# Patient Record
Sex: FEMALE | Race: WHITE | Employment: FULL TIME | URBAN - METROPOLITAN AREA
[De-identification: names, ages, dates, MRNs, and addresses within clinical notes are randomized per-mention and may not be internally consistent; named-entity substitution may affect disease eponyms.]

---

## 2017-01-23 RX ORDER — DESOGESTREL AND ETHINYL ESTRADIOL 0.15-0.03
KIT ORAL
Qty: 3 PACKAGE | Refills: 12 | Status: SHIPPED | OUTPATIENT
Start: 2017-01-23 | End: 2018-02-16

## 2017-01-23 RX ORDER — SUMATRIPTAN 100 MG/1
100 TABLET, FILM COATED ORAL
COMMUNITY
Start: 2015-08-12 | End: 2017-06-13

## 2017-01-23 RX ORDER — DESOGESTREL AND ETHINYL ESTRADIOL 0.15-0.03
KIT ORAL
Refills: 0 | COMMUNITY
Start: 2016-12-24 | End: 2017-01-23

## 2017-01-23 RX ORDER — BUTALBITAL, ASPIRIN, AND CAFFEINE 50; 325; 40 MG/1; MG/1; MG/1
2 CAPSULE ORAL
COMMUNITY
Start: 2014-10-01 | End: 2017-04-19

## 2017-01-23 RX ORDER — TOPIRAMATE 50 MG/1
TABLET, FILM COATED ORAL
Refills: 5 | COMMUNITY
Start: 2017-01-19 | End: 2017-04-19

## 2017-01-23 RX ORDER — ALBUTEROL SULFATE 2.5 MG/3ML
2.5 SOLUTION RESPIRATORY (INHALATION)
COMMUNITY
Start: 2007-01-04 | End: 2017-02-15

## 2017-01-23 NOTE — TELEPHONE ENCOUNTER
Pt is asking for refill of her birth control- had her px in November. Pt  Is out. Lauryn Mccord, 01/23/2017, 4:48 PM

## 2017-02-15 ENCOUNTER — OFFICE VISIT (OUTPATIENT)
Dept: FAMILY MEDICINE CLINIC | Facility: CLINIC | Age: 27
End: 2017-02-15

## 2017-02-15 VITALS
WEIGHT: 142.19 LBS | BODY MASS INDEX: 23.98 KG/M2 | DIASTOLIC BLOOD PRESSURE: 80 MMHG | SYSTOLIC BLOOD PRESSURE: 108 MMHG | HEIGHT: 64.5 IN | HEART RATE: 80 BPM | TEMPERATURE: 98 F | RESPIRATION RATE: 20 BRPM | OXYGEN SATURATION: 99 %

## 2017-02-15 DIAGNOSIS — J45.20 MILD INTERMITTENT ASTHMA WITHOUT COMPLICATION: ICD-10-CM

## 2017-02-15 DIAGNOSIS — J20.9 BRONCHITIS WITH BRONCHOSPASM: Primary | ICD-10-CM

## 2017-02-15 PROBLEM — Z90.89 S/P TONSILLECTOMY AND ADENOIDECTOMY: Status: ACTIVE | Noted: 2017-02-15

## 2017-02-15 PROBLEM — J45.909 ASTHMA: Status: ACTIVE | Noted: 2017-02-15

## 2017-02-15 PROCEDURE — 99214 OFFICE O/P EST MOD 30 MIN: CPT | Performed by: FAMILY MEDICINE

## 2017-02-15 RX ORDER — ALBUTEROL SULFATE 2.5 MG/3ML
2.5 SOLUTION RESPIRATORY (INHALATION) EVERY 4 HOURS PRN
Qty: 1 BOX | Refills: 1 | Status: SHIPPED | OUTPATIENT
Start: 2017-02-15 | End: 2018-08-30

## 2017-02-15 RX ORDER — CEFDINIR 300 MG/1
300 CAPSULE ORAL 2 TIMES DAILY
Qty: 20 CAPSULE | Refills: 0 | Status: SHIPPED | OUTPATIENT
Start: 2017-02-15 | End: 2017-04-19

## 2017-02-16 NOTE — PROGRESS NOTES
CHIEF COMPLAINT:   Patient presents with:  Cough: 4 days  Chest Congestion: 2 days- also nasal congestion      HPI:   Arelis Guillen is a 27year old female who presents to clinic today with complaints of uri-cough and congestion  Pt noting pain from cou numbness or tingling in face.  See HPI    EXAM:   /80 mmHg  Pulse 80  Temp(Src) 98.4 °F (36.9 °C) (Tympanic)  Resp 20  Ht 64.5\"  Wt 142 lb 3 oz  BMI 24.04 kg/m2  SpO2 99%  LMP 01/25/2017  GENERAL: well developed, well nourished,in no apparent distres MD

## 2017-04-19 ENCOUNTER — OFFICE VISIT (OUTPATIENT)
Dept: FAMILY MEDICINE CLINIC | Facility: CLINIC | Age: 27
End: 2017-04-19

## 2017-04-19 VITALS
DIASTOLIC BLOOD PRESSURE: 60 MMHG | BODY MASS INDEX: 25 KG/M2 | RESPIRATION RATE: 16 BRPM | SYSTOLIC BLOOD PRESSURE: 112 MMHG | WEIGHT: 145.5 LBS | HEART RATE: 62 BPM | TEMPERATURE: 99 F

## 2017-04-19 DIAGNOSIS — B97.7 HUMAN PAPILLOMA VIRUS: ICD-10-CM

## 2017-04-19 DIAGNOSIS — R87.612 PAP SMEAR ABNORMALITY OF CERVIX WITH LGSIL: ICD-10-CM

## 2017-04-19 DIAGNOSIS — R87.610 ATYPICAL SQUAMOUS CELLS OF UNDETERMINED SIGNIFICANCE (ASCUS) ON PAPANICOLAOU SMEAR OF CERVIX: Primary | ICD-10-CM

## 2017-04-19 DIAGNOSIS — N87.0 MILD DYSPLASIA OF CERVIX (CIN I): ICD-10-CM

## 2017-04-19 PROCEDURE — 99214 OFFICE O/P EST MOD 30 MIN: CPT | Performed by: FAMILY MEDICINE

## 2017-04-19 PROCEDURE — 87491 CHLMYD TRACH DNA AMP PROBE: CPT | Performed by: FAMILY MEDICINE

## 2017-04-19 PROCEDURE — 87624 HPV HI-RISK TYP POOLED RSLT: CPT | Performed by: FAMILY MEDICINE

## 2017-04-19 PROCEDURE — 87591 N.GONORRHOEAE DNA AMP PROB: CPT | Performed by: FAMILY MEDICINE

## 2017-04-19 PROCEDURE — 88175 CYTOPATH C/V AUTO FLUID REDO: CPT | Performed by: FAMILY MEDICINE

## 2017-04-19 NOTE — PROGRESS NOTES
CC: Annual Physical Exam    HPI:   Meño Chow is a 27year old female who presents for a complete physical exam. Symptoms: denies discharge, itching, burning or dysuria, periods are regular. Patient complains of occasional mild discharge.   No abdominal No            Other Topics            Concern  Caffeine Concern        No  Exercise                No  Seat Belt               Yes  Special Diet            No  Stress Concern          No  Weight Concern          No            GYNE HX            REVIEW OF S tenderness  : Introitus normal, no lesion, vagina moist, no significant discharge, cervix non-tender, no adnexal masses or tenderness, no masses on bimanual exam, bladder not distended.   EXTREMITIES:  No edema, no cyanosis, no clubbing,  NEURO:  No defic

## 2017-04-21 ENCOUNTER — TELEPHONE (OUTPATIENT)
Dept: FAMILY MEDICINE CLINIC | Facility: CLINIC | Age: 27
End: 2017-04-21

## 2017-04-25 ENCOUNTER — TELEPHONE (OUTPATIENT)
Dept: FAMILY MEDICINE CLINIC | Facility: CLINIC | Age: 27
End: 2017-04-25

## 2017-04-25 NOTE — TELEPHONE ENCOUNTER
----- Message from Paris Armas MD sent at 4/24/2017  8:22 PM CDT -----  Negative HPV  Negative pap  Recheck 1 yr

## 2017-05-26 ENCOUNTER — OFFICE VISIT (OUTPATIENT)
Dept: FAMILY MEDICINE CLINIC | Facility: CLINIC | Age: 27
End: 2017-05-26

## 2017-05-26 VITALS
TEMPERATURE: 99 F | HEART RATE: 82 BPM | BODY MASS INDEX: 25 KG/M2 | DIASTOLIC BLOOD PRESSURE: 72 MMHG | SYSTOLIC BLOOD PRESSURE: 102 MMHG | WEIGHT: 149.81 LBS | OXYGEN SATURATION: 98 %

## 2017-05-26 DIAGNOSIS — J02.9 SORE THROAT: ICD-10-CM

## 2017-05-26 DIAGNOSIS — J01.00 ACUTE NON-RECURRENT MAXILLARY SINUSITIS: Primary | ICD-10-CM

## 2017-05-26 DIAGNOSIS — Z87.891 FORMER SMOKER: ICD-10-CM

## 2017-05-26 PROBLEM — G43.909 MIGRAINE: Status: ACTIVE | Noted: 2017-05-26

## 2017-05-26 PROCEDURE — 87081 CULTURE SCREEN ONLY: CPT | Performed by: NURSE PRACTITIONER

## 2017-05-26 PROCEDURE — 87880 STREP A ASSAY W/OPTIC: CPT | Performed by: NURSE PRACTITIONER

## 2017-05-26 PROCEDURE — 99214 OFFICE O/P EST MOD 30 MIN: CPT | Performed by: NURSE PRACTITIONER

## 2017-05-26 RX ORDER — AMOXICILLIN 500 MG/1
500 CAPSULE ORAL 3 TIMES DAILY
Qty: 30 CAPSULE | Refills: 0 | Status: SHIPPED | OUTPATIENT
Start: 2017-05-26 | End: 2017-06-05

## 2017-05-26 NOTE — PROGRESS NOTES
Chief complaint:  Patient presents with:  Cough  Nasal Congestion  Sore Throat      HPI:   Evelio Perez is a 32year old female who presents for upper respiratory symptoms for  2  weeks.   Patient states that she started having nasal congestion and postna RECLIPSEN 0.15-30 MG-MCG Oral Tab TK 1 T PO QD Disp: 3 Package Rfl: 12      Past Medical History   Diagnosis Date   • Asthma           Past Surgical History    CHOLECYSTECTOMY      WISDOM TEETH REMOVED      TONSILLECTOMY        Family History   Problem R with a clear background (yes/no) Yes Yes/No   Kit Lot # [id number] Numeric   Kit Expiration Date 2018-10-31 Date     Strep throat culture pending      ASSESSMENT AND PLAN:   Jac Arce is a 32year old female who presents with Cough;  Nasal Congestion;

## 2017-05-26 NOTE — PATIENT INSTRUCTIONS
Push fluids, rest.  Antibiotic as prescribed, take with food. Flonase nasal spray 1 spray each nostril twice a day. Stop afrin nasal spray. Tylenol or ibuprofen over the counter for discomfort. Mucinex otc for symptom relief.   1tsp honey three times a

## 2017-05-30 ENCOUNTER — TELEPHONE (OUTPATIENT)
Dept: FAMILY MEDICINE CLINIC | Facility: CLINIC | Age: 27
End: 2017-05-30

## 2017-05-30 NOTE — TELEPHONE ENCOUNTER
----- Message from SIMONE Chun sent at 5/30/2017  9:27 AM CDT -----  Please notify pt that strep culture is negative.

## 2017-06-13 ENCOUNTER — TELEPHONE (OUTPATIENT)
Dept: FAMILY MEDICINE CLINIC | Facility: CLINIC | Age: 27
End: 2017-06-13

## 2017-06-13 RX ORDER — SUMATRIPTAN 100 MG/1
TABLET, FILM COATED ORAL
Qty: 30 TABLET | Refills: 0 | Status: SHIPPED | OUTPATIENT
Start: 2017-06-13 | End: 2018-05-21

## 2017-06-13 NOTE — TELEPHONE ENCOUNTER
LOV: 5/26/17     Pt has stopped seeing her neurologist and said she should call you for refills. Took last pill this morning- has been having headaches non-stop the past 3 days.

## 2017-09-05 ENCOUNTER — TELEPHONE (OUTPATIENT)
Dept: FAMILY MEDICINE CLINIC | Facility: CLINIC | Age: 27
End: 2017-09-05

## 2017-09-05 NOTE — TELEPHONE ENCOUNTER
I called the pharmacy regarding the refill of the patient's birth control not in stock. Brenda Coyne from the [facility] that they received their order for her birth control. Patient informed.

## 2017-09-05 NOTE — TELEPHONE ENCOUNTER
This is a generic ocp. Pt should be able to have pharmacist changer her to another with out changing dose or prescription.

## 2017-09-05 NOTE — TELEPHONE ENCOUNTER
[facility] doesn't carry medication Reclipson (sp?) Wondering if there is another medication she can take

## 2017-09-05 NOTE — TELEPHONE ENCOUNTER
Patient states that she tried to get a refill of her reclipsen and all the pharmacies in the [facility] no longer carry this medication. She states she called around to several [facility] and [facility].   She thinks she will have to be changed to something

## 2017-10-27 ENCOUNTER — TELEPHONE (OUTPATIENT)
Dept: FAMILY MEDICINE CLINIC | Facility: CLINIC | Age: 27
End: 2017-10-27

## 2017-10-27 NOTE — TELEPHONE ENCOUNTER
Pt called stating she has had nasal congestion x 1 month. On Wednesday she started with a raspy voice but denies any throat pain. She states that today she has been having what she describes as an \"aching in the center of her chest\".  She denies any cough

## 2017-10-27 NOTE — TELEPHONE ENCOUNTER
Future Appointments  Date Time Provider Amberly Bárbara   10/28/2017 9:30 AM Alem Coleman, [facility] [place]     Pt informed.

## 2017-10-28 ENCOUNTER — OFFICE VISIT (OUTPATIENT)
Dept: FAMILY MEDICINE CLINIC | Facility: CLINIC | Age: 27
End: 2017-10-28

## 2017-10-28 VITALS
BODY MASS INDEX: 24.37 KG/M2 | SYSTOLIC BLOOD PRESSURE: 110 MMHG | DIASTOLIC BLOOD PRESSURE: 70 MMHG | WEIGHT: 151.63 LBS | HEART RATE: 83 BPM | RESPIRATION RATE: 16 BRPM | OXYGEN SATURATION: 97 % | TEMPERATURE: 99 F | HEIGHT: 66 IN

## 2017-10-28 DIAGNOSIS — J01.90 ACUTE SINUSITIS, RECURRENCE NOT SPECIFIED, UNSPECIFIED LOCATION: Primary | ICD-10-CM

## 2017-10-28 PROCEDURE — 99213 OFFICE O/P EST LOW 20 MIN: CPT | Performed by: NURSE PRACTITIONER

## 2017-10-28 RX ORDER — PREDNISONE 20 MG/1
20 TABLET ORAL DAILY
Qty: 5 TABLET | Refills: 0 | Status: SHIPPED | OUTPATIENT
Start: 2017-10-28 | End: 2017-11-02

## 2017-10-28 NOTE — PROGRESS NOTES
HPI:    Patient ID: David Nguyen is a 32year old female. HPI   Patient is present with concern about sinuses. States she has been fighting congestion for the past month. Thought it was related to the weather.   When staying her symptoms got signific well-nourished. No distress. HENT:   Head: Normocephalic and atraumatic. Right Ear: Hearing, tympanic membrane, external ear and ear canal normal.   Left Ear: Hearing, tympanic membrane, external ear and ear canal normal.   Nose: Mucosal edema present.

## 2017-10-28 NOTE — PATIENT INSTRUCTIONS
Take the prednisone with food and earlier in the day. When completing the prednisone resume the Flonase. Will contact me next week if symptoms not improving or if congestion becomes more infectious. Otherwise follow-up as needed.

## 2018-01-09 ENCOUNTER — TELEPHONE (OUTPATIENT)
Dept: FAMILY MEDICINE CLINIC | Facility: CLINIC | Age: 28
End: 2018-01-09

## 2018-01-09 RX ORDER — OSELTAMIVIR PHOSPHATE 75 MG/1
75 CAPSULE ORAL 2 TIMES DAILY
Qty: 10 CAPSULE | Refills: 0 | Status: SHIPPED | OUTPATIENT
Start: 2018-01-09 | End: 2018-01-14

## 2018-01-09 NOTE — TELEPHONE ENCOUNTER
mom seen elesewhere diag. 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  Please advise

## 2018-01-09 NOTE — TELEPHONE ENCOUNTER
Pt states her mother was dx: with influenza A on Saturday at urgent care. Pt lives with her mother. Pt states she is coming down with flu-like s/s today. Pt states she was sent home from work today. Pt c/o low grade temp 100.3, body aches, sore throat.

## 2018-01-09 NOTE — TELEPHONE ENCOUNTER
Pt well known to me. Options of symptomatic care, appt or tamiflu Rx can be offered. If pt takes Rx for tamiflu and is not better or has worsening symptoms-  She should be seen with appointment.

## 2018-02-16 NOTE — TELEPHONE ENCOUNTER
Future appt:    Last Appointment: 10/28/2017    No results found for: CHOLEST, HDL, LDL, TRIGLY, TRIG  No results found for: EAG, A1C  No results found for: T4F, TSH, TSHT4    No followup indicated

## 2018-02-19 ENCOUNTER — TELEPHONE (OUTPATIENT)
Dept: FAMILY MEDICINE CLINIC | Facility: CLINIC | Age: 28
End: 2018-02-19

## 2018-02-19 NOTE — TELEPHONE ENCOUNTER
Patient states she got her eyebrow's tinted for the first time on Thursday. Now has \"hives\" or lumps under skin that is extremely dry it is painful. States swelling is almost to her eyelids. If she moves her face or makes an expression it hurts.  States p

## 2018-02-19 NOTE — TELEPHONE ENCOUNTER
It sounds like she might have a secondary bacterial infection. Would recommend applying bacitracin ointment and following up for an appointment for evaluation to see if she needs oral antibiotics.

## 2018-02-20 ENCOUNTER — OFFICE VISIT (OUTPATIENT)
Dept: FAMILY MEDICINE CLINIC | Facility: CLINIC | Age: 28
End: 2018-02-20

## 2018-02-20 VITALS
SYSTOLIC BLOOD PRESSURE: 118 MMHG | OXYGEN SATURATION: 98 % | HEART RATE: 71 BPM | WEIGHT: 156 LBS | BODY MASS INDEX: 25 KG/M2 | DIASTOLIC BLOOD PRESSURE: 80 MMHG | TEMPERATURE: 98 F

## 2018-02-20 DIAGNOSIS — L01.00 IMPETIGO: Primary | ICD-10-CM

## 2018-02-20 PROCEDURE — 99213 OFFICE O/P EST LOW 20 MIN: CPT | Performed by: NURSE PRACTITIONER

## 2018-02-20 NOTE — PROGRESS NOTES
Evelio Perez is a 32year old female.   Patient presents with:  Swelling: eyebrows      HPI:   Complaints of tinted and waxed on Thursday (February 15)– states she had some hives at the times -states in the past she occasionally will have hives briefly, b RESPIRATORY: denies complaints   CARDIOVASCULAR: denies complaints   MUSCULOSKELETAL:  No arthralgias or myalgias    EXAM:   /80 (BP Location: Right arm, Patient Position: Sitting, Cuff Size: adult)   Pulse 71   Temp 98 °F (36.7 °C) (Tympanic)   Wt 1 Many types of bacteria live on normal, healthy skin. The bacteria usually don’t cause problems. Impetigo happens when bacteria enter the skin through a scratch, break, sore, bite, or irritated spot.  They then begin to grow out of control, leading to infect · Clean the affected skin several times a day. Don’t scrub. Instead, soak the area in warm, soapy water. This will help remove the crust that forms.  For places that you can't soak, such as the face, place a clean, warm (not hot) washcloth on the affected a

## 2018-02-20 NOTE — PATIENT INSTRUCTIONS
Baby shampoo- wash eyebrows with warm water and shampoo. Apply Bacitracin ointment - twice a day for 7 days -  Call if increased redness, would give or oral antibiotic. Understanding Impetigo  Impetigo is a common bacterial infection of the skin.  It your sores. It may help to cover affected areas with a bandage. · To prevent spreading the infection, wash your hands often. Avoid sharing personal items, towels, clothes, pillows, and sheets with others. After each use, wash these items in hot water.   ·

## 2018-02-21 ENCOUNTER — TELEPHONE (OUTPATIENT)
Dept: FAMILY MEDICINE CLINIC | Facility: CLINIC | Age: 28
End: 2018-02-21

## 2018-02-21 RX ORDER — CEPHALEXIN 500 MG/1
500 CAPSULE ORAL 3 TIMES DAILY
Qty: 21 CAPSULE | Refills: 0 | Status: SHIPPED | OUTPATIENT
Start: 2018-02-21 | End: 2018-02-28

## 2018-02-21 NOTE — TELEPHONE ENCOUNTER
Patient states it has gotten really itchy and is burning. Gotten worse since yesterday. States she thinks she needs the antibotic.   Patient is worried she might get a yeast infection from the antibotic, can you give her something for a yeast infection just

## 2018-02-23 ENCOUNTER — TELEPHONE (OUTPATIENT)
Dept: FAMILY MEDICINE CLINIC | Facility: CLINIC | Age: 28
End: 2018-02-23

## 2018-02-23 NOTE — TELEPHONE ENCOUNTER
Pt was seen per EL on Tues 2/20-   Pt states that she was dx: with impetigo  Pt called back next day- was given treatment  Currently treated with Keflex,  Pt states eyebrows are worse, looks like blisters. Pt leaving for [place] tomorrow. Discussed with OBED.

## 2018-02-23 NOTE — TELEPHONE ENCOUNTER
saw Terrance Zavala on Tuesday and put on an antibiotic for impetigo in her eyebrows/eyes, feels she is getting worse and going out of town tomorrow

## 2018-02-24 ENCOUNTER — OFFICE VISIT (OUTPATIENT)
Dept: FAMILY MEDICINE CLINIC | Facility: CLINIC | Age: 28
End: 2018-02-24

## 2018-02-24 VITALS
TEMPERATURE: 98 F | DIASTOLIC BLOOD PRESSURE: 78 MMHG | RESPIRATION RATE: 16 BRPM | BODY MASS INDEX: 25.89 KG/M2 | WEIGHT: 151.63 LBS | HEIGHT: 64.25 IN | SYSTOLIC BLOOD PRESSURE: 102 MMHG | HEART RATE: 68 BPM

## 2018-02-24 DIAGNOSIS — L24.3 IRRITANT CONTACT DERMATITIS DUE TO COSMETICS: Primary | ICD-10-CM

## 2018-02-24 PROCEDURE — 99214 OFFICE O/P EST MOD 30 MIN: CPT | Performed by: FAMILY MEDICINE

## 2018-02-24 RX ORDER — FLUCONAZOLE 150 MG/1
150 TABLET ORAL ONCE
Qty: 1 TABLET | Refills: 0 | Status: SHIPPED | OUTPATIENT
Start: 2018-02-24 | End: 2018-02-24

## 2018-02-24 RX ORDER — PREDNISONE 20 MG/1
20 TABLET ORAL 2 TIMES DAILY
Qty: 10 TABLET | Refills: 0 | Status: SHIPPED | OUTPATIENT
Start: 2018-02-24 | End: 2018-03-01

## 2018-02-24 NOTE — PROGRESS NOTES
Rebel Rollins is a 32year old female. HPI:   Patient presents for recheck of her irritation of her eyebrows. . Pt has been taking medications as instructed, no medication side effects.  *Patient related the history of having her eyebrows waxed and tint Past Surgical History:  No date: CHOLECYSTECTOMY  No date: TONSILLECTOMY  No date: WISDOM TEETH REMOVED   Social History:  Smoking: Denies       Results for orders placed or performed in visit on 05/26/17  -STREP A ASSAY W/OPTIC   Result Value Ref Range days.  TF#[id number]

## 2018-02-24 NOTE — PATIENT INSTRUCTIONS
REC FINSIH ANTIBIOTIC    CONTINUE BABY SHAMPOO WASHES AND ANTIBIOTIC OINTMENT 2 X A DAY    ADD STEROID FOR 5 DAYS     RECHECK IN A WEEK OR SO    CALL IF CONCERNS

## 2018-03-21 ENCOUNTER — OFFICE VISIT (OUTPATIENT)
Dept: FAMILY MEDICINE CLINIC | Facility: CLINIC | Age: 28
End: 2018-03-21

## 2018-03-21 VITALS
HEIGHT: 64.25 IN | OXYGEN SATURATION: 99 % | HEART RATE: 74 BPM | WEIGHT: 153.81 LBS | TEMPERATURE: 98 F | RESPIRATION RATE: 14 BRPM | DIASTOLIC BLOOD PRESSURE: 60 MMHG | BODY MASS INDEX: 26.26 KG/M2 | SYSTOLIC BLOOD PRESSURE: 104 MMHG

## 2018-03-21 DIAGNOSIS — L24.3 IRRITANT CONTACT DERMATITIS DUE TO COSMETICS: Primary | ICD-10-CM

## 2018-03-21 PROCEDURE — 99212 OFFICE O/P EST SF 10 MIN: CPT | Performed by: FAMILY MEDICINE

## 2018-03-21 NOTE — PROGRESS NOTES
Agnes Osorio is a 32year old female. HPI:   Patient presents for recheck of her dermatitis. Pt has took prednisone/medications as instructed, no medication side effects. Pt notes eyebrow rash much improved after first day of steroids.  Pt happy with (Temporal)   Resp 14   Ht 64.25\"   Wt 153 lb 12.8 oz   SpO2 99%   BMI 26.19 kg/m²   GENERAL: well developed, well nourished,in no apparent distress  SKIN: no rashes,no suspicious lesions, small papule left upper eyebrow, no redness  NECK: supple,no adenop

## 2018-05-21 ENCOUNTER — APPOINTMENT (OUTPATIENT)
Dept: LAB | Age: 28
End: 2018-05-21
Attending: FAMILY MEDICINE
Payer: COMMERCIAL

## 2018-05-21 ENCOUNTER — OFFICE VISIT (OUTPATIENT)
Dept: FAMILY MEDICINE CLINIC | Facility: CLINIC | Age: 28
End: 2018-05-21

## 2018-05-21 VITALS
BODY MASS INDEX: 26.06 KG/M2 | SYSTOLIC BLOOD PRESSURE: 106 MMHG | HEART RATE: 82 BPM | DIASTOLIC BLOOD PRESSURE: 82 MMHG | RESPIRATION RATE: 14 BRPM | HEIGHT: 64.25 IN | TEMPERATURE: 98 F | OXYGEN SATURATION: 98 % | WEIGHT: 152.63 LBS

## 2018-05-21 DIAGNOSIS — Z00.00 ANNUAL PHYSICAL EXAM: Primary | ICD-10-CM

## 2018-05-21 DIAGNOSIS — G43.709 CHRONIC MIGRAINE WITHOUT AURA WITHOUT STATUS MIGRAINOSUS, NOT INTRACTABLE: ICD-10-CM

## 2018-05-21 DIAGNOSIS — J45.20 MILD INTERMITTENT ASTHMA WITHOUT COMPLICATION: ICD-10-CM

## 2018-05-21 DIAGNOSIS — E53.8 ADENOSYLCOBALAMIN SYNTHESIS DEFECT: ICD-10-CM

## 2018-05-21 DIAGNOSIS — E53.8 B12 DEFICIENCY: ICD-10-CM

## 2018-05-21 DIAGNOSIS — N87.0 MILD DYSPLASIA OF CERVIX (CIN I): ICD-10-CM

## 2018-05-21 DIAGNOSIS — B97.7 HUMAN PAPILLOMA VIRUS: ICD-10-CM

## 2018-05-21 DIAGNOSIS — R87.612 PAP SMEAR ABNORMALITY OF CERVIX WITH LGSIL: ICD-10-CM

## 2018-05-21 PROBLEM — Z90.89 S/P TONSILLECTOMY AND ADENOIDECTOMY: Status: RESOLVED | Noted: 2017-02-15 | Resolved: 2018-05-21

## 2018-05-21 PROCEDURE — 36415 COLL VENOUS BLD VENIPUNCTURE: CPT | Performed by: FAMILY MEDICINE

## 2018-05-21 PROCEDURE — 80061 LIPID PANEL: CPT | Performed by: FAMILY MEDICINE

## 2018-05-21 PROCEDURE — 82607 VITAMIN B-12: CPT | Performed by: FAMILY MEDICINE

## 2018-05-21 PROCEDURE — 87624 HPV HI-RISK TYP POOLED RSLT: CPT | Performed by: FAMILY MEDICINE

## 2018-05-21 PROCEDURE — 81001 URINALYSIS AUTO W/SCOPE: CPT | Performed by: FAMILY MEDICINE

## 2018-05-21 PROCEDURE — 88175 CYTOPATH C/V AUTO FLUID REDO: CPT | Performed by: FAMILY MEDICINE

## 2018-05-21 PROCEDURE — 99395 PREV VISIT EST AGE 18-39: CPT | Performed by: FAMILY MEDICINE

## 2018-05-21 PROCEDURE — 80050 GENERAL HEALTH PANEL: CPT | Performed by: FAMILY MEDICINE

## 2018-05-21 RX ORDER — DESOGESTREL AND ETHINYL ESTRADIOL 0.15-0.03
1 KIT ORAL
Qty: 84 TABLET | Refills: 3 | Status: SHIPPED | OUTPATIENT
Start: 2018-05-21 | End: 2019-07-06

## 2018-05-21 RX ORDER — SUMATRIPTAN 100 MG/1
TABLET, FILM COATED ORAL
Qty: 30 TABLET | Refills: 0 | Status: SHIPPED | OUTPATIENT
Start: 2018-05-21 | End: 2019-05-29

## 2018-05-21 NOTE — PATIENT INSTRUCTIONS
Colace -- 1 or 2 a day to soften stool, increase fiber in diet, encourage exercise 30 min 3-5 x a week    Pap done today    Encourage stretching and range of motion exercises of arms and shoulders    Recheck yearly and prn    Fasting labs today

## 2018-05-21 NOTE — PROGRESS NOTES
CC: Annual Physical Exam    HPI:   Zara Cevallos is a 32year old female who presents for a complete physical exam.   On ocp   no smoking 2 years    Hand cramping at times.  No injury no swelling  Pt with some soreness of her left shoulder and the cramping History:  No date: CHOLECYSTECTOMY  No date: TONSILLECTOMY  No date: WISDOM TEETH REMOVED   Family History   Problem Relation Age of Onset   • Cancer Mother 36     melonoma      Social History:   Social History    Marital status: Single              Spouse anxiety. ENDOCRINOLOGIC:  Denies excessive sweating, cold or heat intolerance, polyuria or polydipsia. ALLERGIES:  Denies allergic response, history of asthma, sneezing, hives, eczema or rhinitis.     EXAM:   /82 (BP Location: Right arm, Patient Pos sensory intact, normal reflexes. PSYCH:  Normal mood and affect.  Behavior is normal. Judgement and thought content are normal          ASSESSMENT AND PLAN:   Osman Chavez is a 32year old female who presents for a complete physical exam.  Pap and pelvic deficiency    - VITAMIN B12; Future  - VITAMIN B12    5. Pap smear abnormality of cervix with LGSIL      6. Chronic migraine without aura without status migrainosus, not intractable  Stable, prn meds    7.  Mild intermittent asthma without complication  Sta

## 2018-05-22 ENCOUNTER — TELEPHONE (OUTPATIENT)
Dept: FAMILY MEDICINE CLINIC | Facility: CLINIC | Age: 28
End: 2018-05-22

## 2018-05-22 RX ORDER — CHOLECALCIFEROL (VITAMIN D3) 25 MCG
1000 TABLET,CHEWABLE ORAL DAILY
COMMUNITY
End: 2019-05-29 | Stop reason: ALTCHOICE

## 2018-05-22 NOTE — TELEPHONE ENCOUNTER
----- Message from Monty Ly MD sent at 5/22/2018  7:59 AM CDT -----  Laboratory results showing normal chemistry profile  Normal CBC  Normal lipid profile  Normal thyroid function  B12 level is quite low and patient would benefit from oral supple

## 2018-05-23 ENCOUNTER — TELEPHONE (OUTPATIENT)
Dept: FAMILY MEDICINE CLINIC | Facility: CLINIC | Age: 28
End: 2018-05-23

## 2018-05-23 NOTE — TELEPHONE ENCOUNTER
----- Message from Ovidio Rodriguez MD sent at 5/23/2018  5:01 PM CDT -----  Normal Pap smear, negative HPV screen. Patient to be reassured  I would recommend repeat Pap smear in 3 years--unless new sexual partner that I would do yearly.

## 2018-08-30 ENCOUNTER — OFFICE VISIT (OUTPATIENT)
Dept: FAMILY MEDICINE CLINIC | Facility: CLINIC | Age: 28
End: 2018-08-30
Payer: COMMERCIAL

## 2018-08-30 VITALS
SYSTOLIC BLOOD PRESSURE: 110 MMHG | RESPIRATION RATE: 16 BRPM | HEART RATE: 81 BPM | BODY MASS INDEX: 25 KG/M2 | WEIGHT: 144.19 LBS | OXYGEN SATURATION: 97 % | DIASTOLIC BLOOD PRESSURE: 86 MMHG | TEMPERATURE: 97 F

## 2018-08-30 DIAGNOSIS — J45.21 MILD INTERMITTENT ASTHMATIC BRONCHITIS WITH ACUTE EXACERBATION: Primary | ICD-10-CM

## 2018-08-30 PROCEDURE — 99214 OFFICE O/P EST MOD 30 MIN: CPT | Performed by: NURSE PRACTITIONER

## 2018-08-30 RX ORDER — AZITHROMYCIN 250 MG/1
TABLET, FILM COATED ORAL
Qty: 6 TABLET | Refills: 0 | Status: SHIPPED | OUTPATIENT
Start: 2018-08-30 | End: 2019-03-11 | Stop reason: ALTCHOICE

## 2018-08-30 RX ORDER — ALBUTEROL SULFATE 2.5 MG/3ML
2.5 SOLUTION RESPIRATORY (INHALATION) EVERY 4 HOURS PRN
Qty: 1 BOX | Refills: 1 | Status: SHIPPED | OUTPATIENT
Start: 2018-08-30 | End: 2019-03-11

## 2018-08-30 RX ORDER — PREDNISONE 20 MG/1
TABLET ORAL
Qty: 18 TABLET | Refills: 0 | Status: SHIPPED | OUTPATIENT
Start: 2018-08-30 | End: 2019-03-11 | Stop reason: ALTCHOICE

## 2018-08-30 NOTE — PATIENT INSTRUCTIONS
Rest, increase fluids, salt water gargles ,sandra pot (use distilled water) or saline nasal spray , Claritin-D (behind the counter),  Tylenol/Ibuprofen, follow up if symptoms persist or increase. Nebulizer every 4 hrs as needed.

## 2018-08-30 NOTE — PROGRESS NOTES
CHIEF COMPLAINT:   Patient presents with:  Cough  Shortness Of Breath  Nasal Congestion      HPI:   Osman Chavez is a 25year old female who presents to clinic today with complaints of cough- was up all night with the cough-   Cough is dry- but it hurt 16   Wt 144 lb 3.2 oz   LMP 08/30/2018   SpO2 97%   BMI 24.56 kg/m²   GENERAL: well developed, well nourished,in no apparent distress  HEAD:  no tenderness on palpation of maxillary sinuses  EYES: conjunctiva clear, no discharge  EARS:.  Trace serous fluid pills by mouth daily x 3 days, then 1 pill by mouth daily x 3 days. azithromycin (ZITHROMAX Z-MARCUS) 250 MG Oral Tab 6 tablet 0      Sig: Take two tablets by mouth today, then one tablet daily.               SIMONE Pizarro, [facility]  8/30/2018   7:0

## 2018-11-05 RX ORDER — FLUCONAZOLE 150 MG/1
TABLET ORAL
Qty: 1 TABLET | Refills: 0 | OUTPATIENT
Start: 2018-11-05

## 2018-11-05 NOTE — TELEPHONE ENCOUNTER
Future appt:    Last Appointment:  5/21/2018      Called pt-  Dusty Strickland states she does not currently need Fluconazole refilled. RX canceled.         Cholesterol, Total (mg/dL)   Date Value   05/21/2018 197     HDL Cholesterol (mg/dL)   Date Value   05/21/201

## 2019-03-11 ENCOUNTER — OFFICE VISIT (OUTPATIENT)
Dept: FAMILY MEDICINE CLINIC | Facility: CLINIC | Age: 29
End: 2019-03-11
Payer: COMMERCIAL

## 2019-03-11 VITALS
WEIGHT: 153.13 LBS | DIASTOLIC BLOOD PRESSURE: 64 MMHG | OXYGEN SATURATION: 99 % | BODY MASS INDEX: 25.83 KG/M2 | HEART RATE: 94 BPM | HEIGHT: 64.5 IN | SYSTOLIC BLOOD PRESSURE: 94 MMHG | RESPIRATION RATE: 18 BRPM | TEMPERATURE: 98 F

## 2019-03-11 DIAGNOSIS — M79.10 MYALGIA: ICD-10-CM

## 2019-03-11 DIAGNOSIS — J40 BRONCHITIS: Primary | ICD-10-CM

## 2019-03-11 LAB
FLUAV + FLUBV RNA SPEC NAA+PROBE: NEGATIVE
FLUAV + FLUBV RNA SPEC NAA+PROBE: NEGATIVE
FLUAV + FLUBV RNA SPEC NAA+PROBE: POSITIVE

## 2019-03-11 PROCEDURE — 99214 OFFICE O/P EST MOD 30 MIN: CPT | Performed by: NURSE PRACTITIONER

## 2019-03-11 PROCEDURE — 87798 DETECT AGENT NOS DNA AMP: CPT | Performed by: NURSE PRACTITIONER

## 2019-03-11 PROCEDURE — 87502 INFLUENZA DNA AMP PROBE: CPT | Performed by: NURSE PRACTITIONER

## 2019-03-11 RX ORDER — ALBUTEROL SULFATE 2.5 MG/3ML
2.5 SOLUTION RESPIRATORY (INHALATION) EVERY 4 HOURS PRN
Qty: 1 BOX | Refills: 1 | Status: SHIPPED | OUTPATIENT
Start: 2019-03-11 | End: 2021-08-23

## 2019-03-11 RX ORDER — PREDNISONE 20 MG/1
TABLET ORAL
Qty: 21 TABLET | Refills: 0 | Status: SHIPPED | OUTPATIENT
Start: 2019-03-11 | End: 2019-03-20

## 2019-03-11 RX ORDER — BENZONATATE 200 MG/1
100 CAPSULE ORAL 3 TIMES DAILY PRN
COMMUNITY
End: 2019-05-29 | Stop reason: ALTCHOICE

## 2019-03-11 RX ORDER — AMOXICILLIN AND CLAVULANATE POTASSIUM 875; 125 MG/1; MG/1
1 TABLET, FILM COATED ORAL 2 TIMES DAILY
Qty: 20 TABLET | Refills: 0 | Status: SHIPPED | OUTPATIENT
Start: 2019-03-11 | End: 2019-03-21

## 2019-03-11 NOTE — PROGRESS NOTES
Rossi Beckwith is a 25year old female. Patient presents with:  Cough  Chest Congestion  Shortness Of Breath      HPI:   Complaints of mole removal right chest- 17 stitches -  Friday (3/8)  Woke up sat am .  lg fever - taking tylenol feels very tight.    [place] Drug use: No    Family History   Problem Relation Age of Onset   • Cancer Mother 36        melonoma        Allergies    Clarithromycin              Comment:Other reaction(s): vomitting/rm  Clindamycin             OTHER (SEE COMMENTS)  Sulfamethoxazole W/* Soln 1 Box 1     Sig: Take 3 mL (2.5 mg total) by nebulization every 4 (four) hours as needed for Wheezing. PRN use for asthma       Imaging & Consults:  None    No Follow-up on file.   Patient Instructions   Rest, increase fluids, salt water gargles ,netti

## 2019-03-11 NOTE — PATIENT INSTRUCTIONS
Rest, increase fluids, salt water gargles ,sandra pot (use distilled water) or saline nasal spray, Mucinex-D (behind the counter), Delsym for cough suppressant,  Tylenol/Ibuprofen, follow up if symptoms persist or increase.

## 2019-03-12 ENCOUNTER — TELEPHONE (OUTPATIENT)
Dept: FAMILY MEDICINE CLINIC | Facility: CLINIC | Age: 29
End: 2019-03-12

## 2019-03-12 NOTE — TELEPHONE ENCOUNTER
Pt was notified she is positive for flu A. Pt will start Tamiflu. Pt expressed understanding and thanks.

## 2019-03-12 NOTE — TELEPHONE ENCOUNTER
Patient was seen yesterday and was told that she could go back to work. Today she received a call that she has Influenza A, patient is wondering if she can still go back to work with that diagnosis or if she should stay home, would like a call back.

## 2019-03-12 NOTE — TELEPHONE ENCOUNTER
Informed pt she can return to work when she is fever free for 24 hours. Pt expressed understanding and thanks.

## 2019-03-12 NOTE — TELEPHONE ENCOUNTER
----- Message from FIORDALIZA Capone sent at 3/12/2019  9:00 AM CDT -----  Please call patient my chart message sent as below-  Your influenza is positive for flu A you need to start Tamiflu as soon as possible prescription was sent to [facility] in [facility]

## 2019-05-29 ENCOUNTER — OFFICE VISIT (OUTPATIENT)
Dept: FAMILY MEDICINE CLINIC | Facility: CLINIC | Age: 29
End: 2019-05-29
Payer: COMMERCIAL

## 2019-05-29 VITALS
OXYGEN SATURATION: 99 % | HEIGHT: 64.5 IN | BODY MASS INDEX: 26.58 KG/M2 | TEMPERATURE: 99 F | DIASTOLIC BLOOD PRESSURE: 66 MMHG | RESPIRATION RATE: 16 BRPM | WEIGHT: 157.63 LBS | HEART RATE: 78 BPM | SYSTOLIC BLOOD PRESSURE: 108 MMHG

## 2019-05-29 DIAGNOSIS — G43.709 CHRONIC MIGRAINE WITHOUT AURA WITHOUT STATUS MIGRAINOSUS, NOT INTRACTABLE: ICD-10-CM

## 2019-05-29 DIAGNOSIS — N87.0 MILD DYSPLASIA OF CERVIX (CIN I): ICD-10-CM

## 2019-05-29 DIAGNOSIS — R87.612 PAP SMEAR ABNORMALITY OF CERVIX WITH LGSIL: ICD-10-CM

## 2019-05-29 DIAGNOSIS — Z00.00 ANNUAL PHYSICAL EXAM: Primary | ICD-10-CM

## 2019-05-29 DIAGNOSIS — B97.7 HUMAN PAPILLOMA VIRUS: ICD-10-CM

## 2019-05-29 DIAGNOSIS — N92.6 IRREGULAR MENSES: ICD-10-CM

## 2019-05-29 PROCEDURE — 80050 GENERAL HEALTH PANEL: CPT | Performed by: FAMILY MEDICINE

## 2019-05-29 PROCEDURE — 88175 CYTOPATH C/V AUTO FLUID REDO: CPT | Performed by: FAMILY MEDICINE

## 2019-05-29 PROCEDURE — 87624 HPV HI-RISK TYP POOLED RSLT: CPT | Performed by: FAMILY MEDICINE

## 2019-05-29 PROCEDURE — 87591 N.GONORRHOEAE DNA AMP PROB: CPT | Performed by: FAMILY MEDICINE

## 2019-05-29 PROCEDURE — 87491 CHLMYD TRACH DNA AMP PROBE: CPT | Performed by: FAMILY MEDICINE

## 2019-05-29 PROCEDURE — 84439 ASSAY OF FREE THYROXINE: CPT | Performed by: FAMILY MEDICINE

## 2019-05-29 PROCEDURE — 99395 PREV VISIT EST AGE 18-39: CPT | Performed by: FAMILY MEDICINE

## 2019-05-29 RX ORDER — SUMATRIPTAN 100 MG/1
TABLET, FILM COATED ORAL
Qty: 30 TABLET | Refills: 0 | Status: SHIPPED | OUTPATIENT
Start: 2019-05-29 | End: 2019-05-30

## 2019-05-29 NOTE — PROGRESS NOTES
CC: Annual Physical Exam    HPI:   Lauren Casas is a 30year old female who presents for a complete physical exam.   Pt doing well.   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   Diet: watches minimally     GYNE HX-see HPI            REVI distress. HEENT:  Head:  Normocephalic, atraumatic   Eyes: EOMI, PERRLA, no scleral icterus, conjunctivae clear bilaterally, no eye discharge   Ears: TM's clear and intact bilaterally, no excess cerumen or erythema.   Nose: patent, no nasal discharge   Thr COLLECTION; Future  - CHLAMYDIA/GONOCOCCUS, WILD; Future  - THINPREP PAP SMEAR B  - HPV HIGH RISK , THIN PREP COLLECTION  - CHLAMYDIA/GONOCOCCUS, WILD  - THINPREP PAP SMEAR ONLY; Future  - THINPREP PAP SMEAR ONLY    2.  Irregular menses    - CBC WITH DIFFEREN

## 2019-05-30 RX ORDER — SUMATRIPTAN 100 MG/1
TABLET, FILM COATED ORAL
Qty: 30 TABLET | Refills: 0 | Status: SHIPPED | OUTPATIENT
Start: 2019-05-30 | End: 2021-08-23

## 2019-06-03 ENCOUNTER — TELEPHONE (OUTPATIENT)
Dept: FAMILY MEDICINE CLINIC | Facility: CLINIC | Age: 29
End: 2019-06-03

## 2019-06-03 NOTE — TELEPHONE ENCOUNTER
----- Message from Chloe Holguin MD sent at 6/2/2019  8:59 PM CDT -----  Normal pap, negative HPV, recheck with physical in 1 year

## 2019-06-14 ENCOUNTER — OFFICE VISIT (OUTPATIENT)
Dept: FAMILY MEDICINE CLINIC | Facility: CLINIC | Age: 29
End: 2019-06-14
Payer: COMMERCIAL

## 2019-06-14 VITALS
DIASTOLIC BLOOD PRESSURE: 62 MMHG | OXYGEN SATURATION: 98 % | BODY MASS INDEX: 27 KG/M2 | TEMPERATURE: 99 F | SYSTOLIC BLOOD PRESSURE: 100 MMHG | RESPIRATION RATE: 16 BRPM | WEIGHT: 157.63 LBS | HEART RATE: 84 BPM

## 2019-06-14 DIAGNOSIS — J02.9 SORE THROAT: Primary | ICD-10-CM

## 2019-06-14 PROCEDURE — 87880 STREP A ASSAY W/OPTIC: CPT | Performed by: NURSE PRACTITIONER

## 2019-06-14 PROCEDURE — 99213 OFFICE O/P EST LOW 20 MIN: CPT | Performed by: NURSE PRACTITIONER

## 2019-06-14 PROCEDURE — 87081 CULTURE SCREEN ONLY: CPT | Performed by: NURSE PRACTITIONER

## 2019-06-14 NOTE — PATIENT INSTRUCTIONS
Your strep swab in office was negative; will send for culture. Symptomatic treatments at this time.     Non-medication:    Rest, increase fluids, broth-based soups, luke warm salt water gargles, cool-mist humidifiers, Neti pot (only use with distilled chandler

## 2019-06-17 ENCOUNTER — TELEPHONE (OUTPATIENT)
Dept: FAMILY MEDICINE CLINIC | Facility: CLINIC | Age: 29
End: 2019-06-17

## 2019-06-17 NOTE — TELEPHONE ENCOUNTER
----- Message from FIORDALIZA Ronquillo sent at 6/17/2019  9:16 AM CDT -----  Please notify patient that her rapid strep test and strep culture were negative for beta-hemolytic strep.

## 2019-07-06 ENCOUNTER — TELEPHONE (OUTPATIENT)
Dept: FAMILY MEDICINE CLINIC | Facility: CLINIC | Age: 29
End: 2019-07-06

## 2019-07-06 RX ORDER — DESOGESTREL AND ETHINYL ESTRADIOL 0.15-0.03
1 KIT ORAL
Qty: 84 TABLET | Refills: 3 | Status: SHIPPED | OUTPATIENT
Start: 2019-07-06 | End: 2020-06-03

## 2019-07-06 NOTE — TELEPHONE ENCOUNTER
Future appt:    Last Appointment:  5/29/2019 ([address])    Pt states she needs her birth control today, will start new pill pack tomorrow.   Pt states she forgot to mention at there appt in May  Cholesterol, Total (mg/dL)   Date Value   05/21/2018 197     HDL Cho

## 2020-02-19 ENCOUNTER — OFFICE VISIT (OUTPATIENT)
Dept: FAMILY MEDICINE CLINIC | Facility: CLINIC | Age: 30
End: 2020-02-19
Payer: COMMERCIAL

## 2020-02-19 VITALS
RESPIRATION RATE: 18 BRPM | SYSTOLIC BLOOD PRESSURE: 118 MMHG | WEIGHT: 155 LBS | DIASTOLIC BLOOD PRESSURE: 78 MMHG | HEIGHT: 64.5 IN | HEART RATE: 72 BPM | OXYGEN SATURATION: 99 % | BODY MASS INDEX: 26.14 KG/M2 | TEMPERATURE: 98 F

## 2020-02-19 DIAGNOSIS — G43.709 CHRONIC MIGRAINE WITHOUT AURA WITHOUT STATUS MIGRAINOSUS, NOT INTRACTABLE: ICD-10-CM

## 2020-02-19 DIAGNOSIS — J06.9 VIRAL UPPER RESPIRATORY TRACT INFECTION: Primary | ICD-10-CM

## 2020-02-19 PROCEDURE — 99213 OFFICE O/P EST LOW 20 MIN: CPT | Performed by: FAMILY MEDICINE

## 2020-02-19 RX ORDER — ACETAMINOPHEN, ASPIRIN AND CAFFEINE 250; 250; 65 MG/1; MG/1; MG/1
1-2 TABLET, FILM COATED ORAL AS NEEDED
COMMUNITY

## 2020-02-19 NOTE — PATIENT INSTRUCTIONS
rec hydrate well    Rest    rec aspirin 325 with tylenol 500-- tonight  Ok for execedrin pm ok during the day

## 2020-02-20 NOTE — PROGRESS NOTES
CHIEF COMPLAINT:   Patient presents with:  Headache  Fatigue      HPI:   Agnes Osorio is a 30year old female who presents to clinic today with complaints of 2 days migraine headaches. Patient using Excedrin Migraine.   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      Meds & Refills for this Visit:  Requested Prescriptions      No prescriptions requested or ordered in this encounter         Job Connolly MD

## 2020-02-21 ENCOUNTER — TELEPHONE (OUTPATIENT)
Dept: FAMILY MEDICINE CLINIC | Facility: CLINIC | Age: 30
End: 2020-02-21

## 2020-02-21 RX ORDER — ACETAMINOPHEN AND CODEINE PHOSPHATE 300; 30 MG/1; MG/1
1 TABLET ORAL EVERY 6 HOURS PRN
Qty: 20 TABLET | Refills: 0 | Status: SHIPPED | OUTPATIENT
Start: 2020-02-21 | End: 2020-07-10

## 2020-02-21 NOTE — TELEPHONE ENCOUNTER
Pt seen on 2/19/20. Pt stayed home yesterday, states headache was bearable. Pt was able to rest and hydrate. Pt back to work today.  Took one Execedrine migraine -  Pt states she doesn't have sharp pain, but definitely dull headache persists and increasin

## 2020-02-21 NOTE — TELEPHONE ENCOUNTER
rec treatment tylenol codien for migrain acute- may make her nauseated so take with food. pt can call update in am if desires verbal cues/1 person assist

## 2020-02-22 ENCOUNTER — TELEPHONE (OUTPATIENT)
Dept: FAMILY MEDICINE CLINIC | Facility: CLINIC | Age: 30
End: 2020-02-22

## 2020-02-22 NOTE — TELEPHONE ENCOUNTER
Pt states she took one dose of Tylenol with Codeine last night. Headache is light/dull. Pt slept well. Pt will continue to monitor.

## 2020-06-03 ENCOUNTER — TELEPHONE (OUTPATIENT)
Dept: FAMILY MEDICINE CLINIC | Facility: CLINIC | Age: 30
End: 2020-06-03

## 2020-06-03 RX ORDER — DESOGESTREL AND ETHINYL ESTRADIOL 0.15-0.03
1 KIT ORAL
Qty: 84 TABLET | Refills: 0 | Status: SHIPPED | OUTPATIENT
Start: 2020-06-03 | End: 2020-07-10

## 2020-06-03 NOTE — TELEPHONE ENCOUNTER
Future appt:     Your appointments     Date & Time Appointment Department [facility])    Jul 10, 2020  4:00 PM CDT Physical - Established with Sophia Salguero MD [address] [facility], [facility] )            Chidi Coy

## 2020-07-10 ENCOUNTER — OFFICE VISIT (OUTPATIENT)
Dept: FAMILY MEDICINE CLINIC | Facility: CLINIC | Age: 30
End: 2020-07-10
Payer: COMMERCIAL

## 2020-07-10 VITALS
BODY MASS INDEX: 26.08 KG/M2 | HEART RATE: 80 BPM | HEIGHT: 64.75 IN | SYSTOLIC BLOOD PRESSURE: 114 MMHG | WEIGHT: 154.63 LBS | TEMPERATURE: 99 F | OXYGEN SATURATION: 98 % | DIASTOLIC BLOOD PRESSURE: 74 MMHG | RESPIRATION RATE: 16 BRPM

## 2020-07-10 DIAGNOSIS — B97.7 HUMAN PAPILLOMA VIRUS: ICD-10-CM

## 2020-07-10 DIAGNOSIS — Z00.00 ANNUAL PHYSICAL EXAM: Primary | ICD-10-CM

## 2020-07-10 DIAGNOSIS — E53.8 B12 DEFICIENCY: ICD-10-CM

## 2020-07-10 DIAGNOSIS — J45.20 MILD INTERMITTENT ASTHMA WITHOUT COMPLICATION: ICD-10-CM

## 2020-07-10 PROCEDURE — 99395 PREV VISIT EST AGE 18-39: CPT | Performed by: FAMILY MEDICINE

## 2020-07-10 PROCEDURE — 88175 CYTOPATH C/V AUTO FLUID REDO: CPT | Performed by: FAMILY MEDICINE

## 2020-07-10 PROCEDURE — 87624 HPV HI-RISK TYP POOLED RSLT: CPT | Performed by: FAMILY MEDICINE

## 2020-07-10 RX ORDER — DESOGESTREL AND ETHINYL ESTRADIOL 0.15-0.03
1 KIT ORAL
Qty: 84 TABLET | Refills: 3 | Status: SHIPPED | OUTPATIENT
Start: 2020-07-10 | End: 2021-08-10

## 2020-07-10 NOTE — PATIENT INSTRUCTIONS
rec MVi, -- ok for prenatal vitamin  Calcium with vit D- 1 a day    Continue exercise    Continue OCP    Recheck yearly and as needed

## 2020-07-10 NOTE — PROGRESS NOTES
CC: Annual Physical Exam    HPI:   Debbie Hitchcock is a 26year old female who presents for a complete physical exam. Symptoms:   . Patient generally doing well. .   May 8 2021 wedding   Patient presently on oral contraceptive pills would like to continue fo Pap smear abnormality of cervix with LGSIL 10/21/2015      Past Surgical History:   Procedure Laterality Date   • CHOLECYSTECTOMY     • TONSILLECTOMY     • WISDOM TEETH REMOVED        Family History   Problem Relation Age of Onset   • Cancer Mother 36 Denies anemia, bleeding or bruising. LYMPHATICS:  Denies enlarged nodes  PSYCHIATRIC:  Denies depression or anxiety. ENDOCRINOLOGIC:  Denies excessive sweating, cold or heat intolerance, polyuria or polydipsia.   ALLERGIES:  Denies allergic response, hist no cyanosis, no clubbing, FROM, 2+ dorsalis pedis pulses bilaterally. NEURO:  No deficit, normal gait, strength and tone, sensory intact, normal reflexes. PSYCH:  Normal mood and affect.  Behavior is normal. Judgement and thought content are normal      A

## 2020-07-12 LAB — HPV I/H RISK 1 DNA SPEC QL NAA+PROBE: NEGATIVE

## 2020-07-15 ENCOUNTER — TELEPHONE (OUTPATIENT)
Dept: FAMILY MEDICINE CLINIC | Facility: CLINIC | Age: 30
End: 2020-07-15

## 2020-07-15 NOTE — TELEPHONE ENCOUNTER
----- Message from Jose Haas MD sent at 7/15/2020 11:36 AM CDT -----  Negative pap, negative HPV screen    Pt hx HPV- repeat pap 1 year  Results forwarded to patient via [employer] system. Patient encouraged to call for any questions or concerns.

## 2020-10-12 ENCOUNTER — TELEPHONE (OUTPATIENT)
Dept: FAMILY MEDICINE CLINIC | Facility: CLINIC | Age: 30
End: 2020-10-12

## 2020-10-12 RX ORDER — ALBUTEROL SULFATE 90 UG/1
AEROSOL, METERED RESPIRATORY (INHALATION)
Qty: 1 INHALER | Refills: 1 | Status: SHIPPED | OUTPATIENT
Start: 2020-10-12

## 2020-10-12 NOTE — TELEPHONE ENCOUNTER
Future appt:    Last Appointment with provider:   7/10/2020Noe Buckley  Last appointment at [facility]:  7/10/2020    Pt has hx: of history of asthma. Pt states she has always just used her Albuterol neb at home whenever needed.   Pt states October is when her

## 2020-12-09 ENCOUNTER — OFFICE VISIT (OUTPATIENT)
Dept: FAMILY MEDICINE CLINIC | Facility: CLINIC | Age: 30
End: 2020-12-09
Payer: COMMERCIAL

## 2020-12-09 VITALS
SYSTOLIC BLOOD PRESSURE: 118 MMHG | TEMPERATURE: 99 F | WEIGHT: 155.81 LBS | DIASTOLIC BLOOD PRESSURE: 78 MMHG | RESPIRATION RATE: 16 BRPM | HEIGHT: 64.75 IN | HEART RATE: 86 BPM | OXYGEN SATURATION: 98 % | BODY MASS INDEX: 26.28 KG/M2

## 2020-12-09 DIAGNOSIS — S46.911A MUSCLE STRAIN OF RIGHT SCAPULAR REGION, INITIAL ENCOUNTER: Primary | ICD-10-CM

## 2020-12-09 PROCEDURE — 3074F SYST BP LT 130 MM HG: CPT | Performed by: FAMILY MEDICINE

## 2020-12-09 PROCEDURE — 3078F DIAST BP <80 MM HG: CPT | Performed by: FAMILY MEDICINE

## 2020-12-09 PROCEDURE — 3008F BODY MASS INDEX DOCD: CPT | Performed by: FAMILY MEDICINE

## 2020-12-09 PROCEDURE — 99213 OFFICE O/P EST LOW 20 MIN: CPT | Performed by: FAMILY MEDICINE

## 2020-12-09 NOTE — PROGRESS NOTES
Justina Hyde is a 26year old female. Patient presents with:  Back Pain: Pt states back pain on right side for the last year      HPI:   Patient here with complaints of back pain.   Patient has been trying to treat at home and is concerned that is not i High Risk Negative Negative    HPV Source Cervical/endocervical    THINPREP PAP SMEAR ONLY   Result Value Ref Range    Interpretation/Result Negative for intraepithelial lesion or malignancy Negative for intraepithelial lesion or malignancy    Specimen [place] 118/78 (BP Location: Right arm, Patient Position: Sitting, Cuff Size: adult)   Pulse 86   Temp 98.6 °F (37 °C) (Temporal)   Resp 16   Ht 5' 4.75\" (1.645 m)   Wt 155 lb 12.8 oz (70.7 kg)   LMP 11/25/2020   SpO2 98%   BMI 26.13 kg/m²     GENERAL: converses

## 2021-01-19 ENCOUNTER — TELEPHONE (OUTPATIENT)
Dept: FAMILY MEDICINE CLINIC | Facility: CLINIC | Age: 31
End: 2021-01-19

## 2021-01-19 NOTE — TELEPHONE ENCOUNTER
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 Pt is not sure if she should take it as she has read many different thing on social media.  Has questions in regards to

## 2021-01-19 NOTE — TELEPHONE ENCOUNTER
Pt states she is getting  in May. Pt states she is planning on going off the pill in June and July and is hoping to get pregnant this year.   Pt states she had received an e-mail today stating that she will have the option to get covid vaccine next

## 2021-01-20 NOTE — TELEPHONE ENCOUNTER
I would advise pt get vaccine. According the the FDA \"There is no scientific evidence to suggest that the vaccine could cause infertility in women.  In addition, infertility is not known to occur as a result of natural COVID-19 disease, further demonstrati

## 2021-04-15 ENCOUNTER — TELEPHONE (OUTPATIENT)
Dept: FAMILY MEDICINE CLINIC | Facility: CLINIC | Age: 31
End: 2021-04-15

## 2021-04-15 NOTE — TELEPHONE ENCOUNTER
Patient getting  on 5/8/21. Patient states Dr. Chema Hull told her to call her to instruct her in how to \" skip BCP\" so she does not get period on her wedding day. Patient's honeymoon trip will be on 5/28/21.  She does not want her period on her ho

## 2021-04-15 NOTE — TELEPHONE ENCOUNTER
Please asked patient when her first day of her last menstrual period was–where she is on her current pill pack

## 2021-04-15 NOTE — TELEPHONE ENCOUNTER
LMP started on 4/7/21  BCP pack restarted on Sunday 4/11/21. ( first week of pack)     Patient states okay to wait for Dr. Anitha Hinds tomorrow.

## 2021-04-16 NOTE — TELEPHONE ENCOUNTER
Reviewed inocorine to help her have little/ no bleeding/ menses for both wedding and trip I advise the following:    Continue present pack of pills but do not take the 4th ( off week) , instead start a second pack and take for 2 more weeks.   This should have

## 2021-05-12 ENCOUNTER — OFFICE VISIT (OUTPATIENT)
Dept: FAMILY MEDICINE CLINIC | Facility: CLINIC | Age: 31
End: 2021-05-12
Payer: COMMERCIAL

## 2021-05-12 VITALS
DIASTOLIC BLOOD PRESSURE: 68 MMHG | SYSTOLIC BLOOD PRESSURE: 110 MMHG | RESPIRATION RATE: 16 BRPM | OXYGEN SATURATION: 97 % | WEIGHT: 155.81 LBS | BODY MASS INDEX: 26.28 KG/M2 | HEIGHT: 64.75 IN | TEMPERATURE: 99 F | HEART RATE: 78 BPM

## 2021-05-12 DIAGNOSIS — H66.001 NON-RECURRENT ACUTE SUPPURATIVE OTITIS MEDIA OF RIGHT EAR WITHOUT SPONTANEOUS RUPTURE OF TYMPANIC MEMBRANE: Primary | ICD-10-CM

## 2021-05-12 PROCEDURE — 3074F SYST BP LT 130 MM HG: CPT | Performed by: NURSE PRACTITIONER

## 2021-05-12 PROCEDURE — 3008F BODY MASS INDEX DOCD: CPT | Performed by: NURSE PRACTITIONER

## 2021-05-12 PROCEDURE — 99213 OFFICE O/P EST LOW 20 MIN: CPT | Performed by: NURSE PRACTITIONER

## 2021-05-12 PROCEDURE — 3078F DIAST BP <80 MM HG: CPT | Performed by: NURSE PRACTITIONER

## 2021-05-12 RX ORDER — AMOXICILLIN 500 MG/1
500 CAPSULE ORAL 3 TIMES DAILY
Qty: 30 CAPSULE | Refills: 0 | Status: SHIPPED | OUTPATIENT
Start: 2021-05-12 | End: 2021-05-22

## 2021-05-12 NOTE — PATIENT INSTRUCTIONS
Rest, increase fluids, salt water gargles ,neti pot (use distilled water) or saline nasal spray,vicks vapo rub, halls,  Mucinex-D (behind the counter), OR Mucinex and sudfafed (behind the counter), Tylenol/Ibuprofen, follow up if symptoms persist or increa

## 2021-05-12 NOTE — PROGRESS NOTES
CHIEF COMPLAINT:   Patient presents with:  Nasal Congestion: Pt states symptoms started yesterday morning. Negative for covid and strep yesterday at immediate care.  Post nasal drip and ear concerns worsening today  Sinus Problem  Ear Problem      HPI: Standard drinks or equivalent per week      Comment: 3-4 per wk- mixed/wine     Drug use: No       REVIEW OF SYSTEMS:   GENERAL: See HPI  SKIN: no unusual skin lesions or rashes  HEENT: See HPI  LUNGS: see HPI   CARDIOVASCULAR: denies complaints   GI: No N treatment plan. Follow up if symptoms do not improve or if symptoms worsen. Risk and benefits of medication discussed. Stressed importance of completing full course of antibiotic.        Meds & Refills for this Visit:  Requested Prescriptions     Signed P

## 2021-05-14 ENCOUNTER — TELEPHONE (OUTPATIENT)
Dept: FAMILY MEDICINE CLINIC | Facility: CLINIC | Age: 31
End: 2021-05-14

## 2021-05-14 ENCOUNTER — NURSE ONLY (OUTPATIENT)
Dept: FAMILY MEDICINE CLINIC | Facility: CLINIC | Age: 31
End: 2021-05-14
Payer: COMMERCIAL

## 2021-05-14 DIAGNOSIS — J06.9 VIRAL UPPER RESPIRATORY TRACT INFECTION: Primary | ICD-10-CM

## 2021-05-14 DIAGNOSIS — J06.9 VIRAL UPPER RESPIRATORY TRACT INFECTION: ICD-10-CM

## 2021-05-14 RX ORDER — DEXAMETHASONE 4 MG/1
2 TABLET ORAL 2 TIMES DAILY
Qty: 1 CAN | Refills: 3 | Status: SHIPPED | OUTPATIENT
Start: 2021-05-14 | End: 2022-05-09

## 2021-05-14 NOTE — TELEPHONE ENCOUNTER
Spoke with patient. She will be here shortly to have a covid swab done.   Patient will call upon arrival.

## 2021-05-14 NOTE — TELEPHONE ENCOUNTER
Does she want to have a Covid swab–if so I can do a swab on her today really quick if she comes to the back door

## 2021-05-14 NOTE — TELEPHONE ENCOUNTER
was seen Wed  for sinus / ear infection         feels sxs worsening     now w cough      please advise      No future appointments.

## 2021-05-14 NOTE — PROGRESS NOTES
Covid testing done by [facility]. Covid swab sent to [facility] as ordered.

## 2021-05-15 ENCOUNTER — TELEPHONE (OUTPATIENT)
Dept: FAMILY MEDICINE CLINIC | Facility: CLINIC | Age: 31
End: 2021-05-15

## 2021-05-15 NOTE — TELEPHONE ENCOUNTER
Spoke with patient regarding positive COVID-19 results–patient states she is feeling okay has not used her nebulizer yet–discussed with patient to use her nebulizer in a bathroom that her  does not use with the door closed exhaust found on and janet

## 2021-05-19 ENCOUNTER — TELEPHONE (OUTPATIENT)
Dept: FAMILY MEDICINE CLINIC | Facility: CLINIC | Age: 31
End: 2021-05-19

## 2021-05-19 RX ORDER — PREDNISONE 20 MG/1
20 TABLET ORAL 2 TIMES DAILY
Qty: 10 TABLET | Refills: 0 | Status: SHIPPED | OUTPATIENT
Start: 2021-05-19 | End: 2021-05-24

## 2021-05-19 NOTE — TELEPHONE ENCOUNTER
Patient states that her R ear still feels like it's clogged, states that Celia Haynes was worried about her flying with an ear infection. States that it doesn't hurt anymore. Would like a call back.

## 2021-05-19 NOTE — TELEPHONE ENCOUNTER
Recommend continuing Flonase 2 sprays each nostril once a day–I sent a prescription for prednisone to [facility] in [place] take it with food–also she can try Sudafed behind the counter.

## 2021-06-04 ENCOUNTER — TELEPHONE (OUTPATIENT)
Dept: FAMILY MEDICINE CLINIC | Facility: CLINIC | Age: 31
End: 2021-06-04

## 2021-06-04 NOTE — TELEPHONE ENCOUNTER
Pt states she is scheduled for her PX next month. Pt states she last spoke with [facility] previously about stopping birth control around this time due - pt is family planning. Pt asked she didn't know if she could just stop per birth control.   Pt is currently o

## 2021-08-10 ENCOUNTER — OFFICE VISIT (OUTPATIENT)
Dept: FAMILY MEDICINE CLINIC | Facility: CLINIC | Age: 31
End: 2021-08-10
Payer: COMMERCIAL

## 2021-08-10 ENCOUNTER — TELEPHONE (OUTPATIENT)
Dept: FAMILY MEDICINE CLINIC | Facility: CLINIC | Age: 31
End: 2021-08-10

## 2021-08-10 VITALS
DIASTOLIC BLOOD PRESSURE: 80 MMHG | HEART RATE: 73 BPM | TEMPERATURE: 98 F | SYSTOLIC BLOOD PRESSURE: 118 MMHG | OXYGEN SATURATION: 99 %

## 2021-08-10 DIAGNOSIS — J45.909 MILD ASTHMA, UNSPECIFIED WHETHER COMPLICATED, UNSPECIFIED WHETHER PERSISTENT: ICD-10-CM

## 2021-08-10 DIAGNOSIS — J02.9 SORE THROAT: Primary | ICD-10-CM

## 2021-08-10 PROCEDURE — 99213 OFFICE O/P EST LOW 20 MIN: CPT | Performed by: NURSE PRACTITIONER

## 2021-08-10 PROCEDURE — 3074F SYST BP LT 130 MM HG: CPT | Performed by: NURSE PRACTITIONER

## 2021-08-10 PROCEDURE — 3079F DIAST BP 80-89 MM HG: CPT | Performed by: NURSE PRACTITIONER

## 2021-08-10 NOTE — PATIENT INSTRUCTIONS
Take acetaminophen or ibuprofen for fever/discomfort  Rapid strep today is negative  Drink plenty of fluids, warm liquids  Decongestants for congestion  Expectorant and/or cough suppressant  Use saline drops as needed or use neti pot/rinse (with distilled

## 2021-08-10 NOTE — TELEPHONE ENCOUNTER
c/o sore throat sxs         using inhalers more for poss asthma       /   declined offer of respitoray clinic @ 2pm       No future appointments.

## 2021-08-10 NOTE — PROGRESS NOTES
HPI/Subjective:   Patient ID: Agnes Osorio is a 33year old female. Patient presents clinic today for evaluation of feeling unwell. States she feels very rundown, since 8/8/2021. Does have history of Covid in May 2021. She is Covid vaccinated.   Sta Wheezing.  PRN use for asthma (Patient not taking: Reported on 8/10/2021 ) 1 Box 1     Allergies:  Clarithromycin              Comment:Other reaction(s): vomitting/rm  Clindamycin             OTHER (SEE COMMENTS)  Sulfamethoxazole W/*        Comment:Other r 78 year old male presented with dysuria. Sent in from urologist's office after getting carrasco placed for symptoms of acute dysuria including difficulty voiding, frequent nocturia with fever of 101 @ urology office. Patient urine cx found to be E. Coli and patient given rocephin. Patient asymptomatic, labs improving, to d/c with ceftin for 8 more days.

## 2021-08-10 NOTE — TELEPHONE ENCOUNTER
Pt c/o sore throat, pt states it is worse since Sunday. Pt c/o of some chest tightness and shortness of breath, pt states her asthma is also flared up some. Pt denies fever. Pt states she is fully vaccinated for covid.   Pt denies cough, denies congestion

## 2021-08-12 ENCOUNTER — TELEPHONE (OUTPATIENT)
Dept: FAMILY MEDICINE CLINIC | Facility: CLINIC | Age: 31
End: 2021-08-12

## 2021-08-12 RX ORDER — AMOXICILLIN AND CLAVULANATE POTASSIUM 875; 125 MG/1; MG/1
1 TABLET, FILM COATED ORAL 2 TIMES DAILY
Qty: 20 TABLET | Refills: 0 | Status: SHIPPED | OUTPATIENT
Start: 2021-08-12 | End: 2021-08-22

## 2021-08-12 RX ORDER — PREDNISONE 20 MG/1
20 TABLET ORAL 2 TIMES DAILY
Qty: 10 TABLET | Refills: 0 | Status: SHIPPED | OUTPATIENT
Start: 2021-08-12 | End: 2021-08-17

## 2021-08-12 NOTE — TELEPHONE ENCOUNTER
Patient was seen in respiratory clinic 8/10/21 for sore throat. She feels worse today. Her sore throat has improved but she has developed a cough , nasal congestion and losing her voice.  She has been using vicks vapor rub, robitussin, warm fluids and nebul

## 2021-08-12 NOTE — TELEPHONE ENCOUNTER
Begin Augmentin antibiotic, 1 tablet twice a day for the next 10 days. Prednisone 20 mg twice a day for the next 5 days. Continue current symptomatic treatment at home, rest and fluids.   Follow-up with the office after the 15th if symptoms persist or if

## 2021-08-12 NOTE — TELEPHONE ENCOUNTER
was seen Tuesday in the [facility]   and was told to call back if not any better         Please advise /  call her back

## 2021-08-23 ENCOUNTER — OFFICE VISIT (OUTPATIENT)
Dept: FAMILY MEDICINE CLINIC | Facility: CLINIC | Age: 31
End: 2021-08-23
Payer: COMMERCIAL

## 2021-08-23 ENCOUNTER — HOSPITAL ENCOUNTER (OUTPATIENT)
Dept: GENERAL RADIOLOGY | Age: 31
Discharge: HOME | End: 2021-08-23
Attending: NURSE PRACTITIONER
Payer: COMMERCIAL

## 2021-08-23 VITALS
BODY MASS INDEX: 28.51 KG/M2 | RESPIRATION RATE: 16 BRPM | DIASTOLIC BLOOD PRESSURE: 70 MMHG | WEIGHT: 167 LBS | HEART RATE: 77 BPM | HEIGHT: 64 IN | OXYGEN SATURATION: 100 % | TEMPERATURE: 98 F | SYSTOLIC BLOOD PRESSURE: 110 MMHG

## 2021-08-23 DIAGNOSIS — S97.82XA CRUSHING INJURY OF LEFT FOOT, INITIAL ENCOUNTER: Primary | ICD-10-CM

## 2021-08-23 DIAGNOSIS — N92.6 LATE MENSES: ICD-10-CM

## 2021-08-23 DIAGNOSIS — S97.82XA CRUSHING INJURY OF LEFT FOOT, INITIAL ENCOUNTER: ICD-10-CM

## 2021-08-23 LAB
CONTROL LINE PRESENT WITH A CLEAR BACKGROUND (YES/NO): YES YES/NO
PREGNANCY TEST, URINE: NEGATIVE

## 2021-08-23 PROCEDURE — 3074F SYST BP LT 130 MM HG: CPT | Performed by: NURSE PRACTITIONER

## 2021-08-23 PROCEDURE — 73630 X-RAY EXAM OF FOOT: CPT | Performed by: NURSE PRACTITIONER

## 2021-08-23 PROCEDURE — 3078F DIAST BP <80 MM HG: CPT | Performed by: NURSE PRACTITIONER

## 2021-08-23 PROCEDURE — 3008F BODY MASS INDEX DOCD: CPT | Performed by: NURSE PRACTITIONER

## 2021-08-23 PROCEDURE — 99213 OFFICE O/P EST LOW 20 MIN: CPT | Performed by: NURSE PRACTITIONER

## 2021-08-23 PROCEDURE — 81025 URINE PREGNANCY TEST: CPT | Performed by: NURSE PRACTITIONER

## 2021-08-23 NOTE — PROGRESS NOTES
Sangeeta Roque is a 33year old female. Patient presents with: Foot Injury: left foot pain due to brick falling on foot.  happened yesterday       HPI:   Complaints of left foot - tripped over a brick- it flipped and landed on her foot   Yesterday     Wor otherwise  HEENT: denies complaints  SKIN: denies any unusual skin lesions or rashes  RESPIRATORY: denies shortness of breath with exertion, no cough  CARDIOVASCULAR: denies complaints   GI: denies abdominal pain, nausea, vomiting, diarrhea   MUSCULOSKELET

## 2021-08-23 NOTE — PATIENT INSTRUCTIONS
Rest, ice, you may apply a neoprene foot wrap for support, take Take Ibuprofen 600mg three times a day with food, or aleve 2 pills twice a day with food.     Follow-up if symptoms persist or increase

## 2021-12-17 ENCOUNTER — PATIENT MESSAGE (OUTPATIENT)
Dept: FAMILY MEDICINE CLINIC | Facility: CLINIC | Age: 31
End: 2021-12-17

## 2021-12-17 ENCOUNTER — TELEPHONE (OUTPATIENT)
Dept: FAMILY MEDICINE CLINIC | Facility: CLINIC | Age: 31
End: 2021-12-17

## 2021-12-17 NOTE — TELEPHONE ENCOUNTER
Left a message on [employer]. She thinks she has a UTI.  symptoms:  frequent trips to bathroom, some burning. No future appointments.

## 2021-12-17 NOTE — TELEPHONE ENCOUNTER
From: Nena Vitale  To: Taiwo Zelaya MD  Sent: 12/17/2021 11:25 AM CST  Subject: Appointment? Hi there,    I’m in a bit of a pickle. I’m wondering if I need an appointment for a possible UTI?  I’m having frequent bathroom trips today at work and

## 2021-12-17 NOTE — TELEPHONE ENCOUNTER
Pt states she started to notice urine frequency and burning yesterday, pt states s/s are getting worse. Pt is drinking plenty of fluids. Pt states she has hx: of UTI's. No appt's available today  Pt asked if she can provide urine sample to the lab.

## 2021-12-18 ENCOUNTER — OFFICE VISIT (OUTPATIENT)
Dept: FAMILY MEDICINE CLINIC | Facility: CLINIC | Age: 31
End: 2021-12-18
Payer: COMMERCIAL

## 2021-12-18 VITALS
SYSTOLIC BLOOD PRESSURE: 108 MMHG | HEART RATE: 84 BPM | RESPIRATION RATE: 18 BRPM | OXYGEN SATURATION: 99 % | WEIGHT: 177 LBS | HEIGHT: 64 IN | TEMPERATURE: 98 F | BODY MASS INDEX: 30.22 KG/M2 | DIASTOLIC BLOOD PRESSURE: 70 MMHG

## 2021-12-18 DIAGNOSIS — R30.0 DYSURIA: Primary | ICD-10-CM

## 2021-12-18 DIAGNOSIS — N30.90 CYSTITIS: ICD-10-CM

## 2021-12-18 PROCEDURE — 99213 OFFICE O/P EST LOW 20 MIN: CPT | Performed by: NURSE PRACTITIONER

## 2021-12-18 PROCEDURE — 3008F BODY MASS INDEX DOCD: CPT | Performed by: NURSE PRACTITIONER

## 2021-12-18 PROCEDURE — 87086 URINE CULTURE/COLONY COUNT: CPT | Performed by: NURSE PRACTITIONER

## 2021-12-18 PROCEDURE — 3078F DIAST BP <80 MM HG: CPT | Performed by: NURSE PRACTITIONER

## 2021-12-18 PROCEDURE — 3074F SYST BP LT 130 MM HG: CPT | Performed by: NURSE PRACTITIONER

## 2021-12-18 PROCEDURE — 81003 URINALYSIS AUTO W/O SCOPE: CPT | Performed by: NURSE PRACTITIONER

## 2021-12-18 PROCEDURE — 87186 SC STD MICRODIL/AGAR DIL: CPT | Performed by: NURSE PRACTITIONER

## 2021-12-18 PROCEDURE — 87077 CULTURE AEROBIC IDENTIFY: CPT | Performed by: NURSE PRACTITIONER

## 2021-12-18 RX ORDER — CEPHALEXIN 250 MG/5ML
500 POWDER, FOR SUSPENSION ORAL 3 TIMES DAILY
Qty: 210 ML | Refills: 0 | Status: SHIPPED | OUTPATIENT
Start: 2021-12-18 | End: 2021-12-25

## 2021-12-18 NOTE — PROGRESS NOTES
HPI:   Patient presents with:  UTI  Urinary Frequency       Mitchell Dong is a 33year old female who complains of burning with urination, frequency and urgency. She has had symptoms for 2 days. She also complains of pelvic pressure.    She denies b

## 2021-12-20 ENCOUNTER — TELEPHONE (OUTPATIENT)
Dept: FAMILY MEDICINE CLINIC | Facility: CLINIC | Age: 31
End: 2021-12-20

## 2021-12-20 NOTE — TELEPHONE ENCOUNTER
----- Message from Conner Blue MD sent at 12/20/2021  1:14 PM CST -----  Pt on keflex from 12/18-- continue  treatment

## 2022-01-17 ENCOUNTER — TELEPHONE (OUTPATIENT)
Dept: FAMILY MEDICINE CLINIC | Facility: CLINIC | Age: 32
End: 2022-01-17

## 2022-01-17 NOTE — TELEPHONE ENCOUNTER
pt has question about her imitrex and tylenol with codeine- did not have a good reaction with the tylenol

## 2022-01-17 NOTE — TELEPHONE ENCOUNTER
Pt states she has had  2 migraines in the last 2 months. Pt is trying to be very careful with meds, pt is trying to get pregnant. Pt had old RX for Imitrex from 5/2019- pt did not feel comfortable using.   Pt also had another old med at home - Tylenol w

## 2022-01-19 ENCOUNTER — TELEPHONE (OUTPATIENT)
Dept: FAMILY MEDICINE CLINIC | Facility: CLINIC | Age: 32
End: 2022-01-19

## 2022-01-19 NOTE — TELEPHONE ENCOUNTER
Pt states she was told per OB- Dr. Victory Lanes- not to take Imitrex, but to try Fioricet. Pt states she looked up side effects and was reading that was contraindicated for pregnancy. Pt is not sure what to do now? Routed to  for review.

## 2022-02-25 ENCOUNTER — OFFICE VISIT (OUTPATIENT)
Dept: FAMILY MEDICINE CLINIC | Facility: CLINIC | Age: 32
End: 2022-02-25
Payer: COMMERCIAL

## 2022-02-25 VITALS
OXYGEN SATURATION: 98 % | SYSTOLIC BLOOD PRESSURE: 118 MMHG | HEIGHT: 64 IN | WEIGHT: 189.63 LBS | RESPIRATION RATE: 18 BRPM | BODY MASS INDEX: 32.37 KG/M2 | HEART RATE: 80 BPM | TEMPERATURE: 99 F | DIASTOLIC BLOOD PRESSURE: 78 MMHG

## 2022-02-25 DIAGNOSIS — R30.0 DYSURIA: Primary | ICD-10-CM

## 2022-02-25 LAB
APPEARANCE: CLEAR
BILIRUBIN: NEGATIVE
GLUCOSE (URINE DIPSTICK): NEGATIVE MG/DL
MULTISTIX EXPIRATION DATE: ABNORMAL DATE
MULTISTIX LOT#: ABNORMAL NUMERIC
NITRITE, URINE: POSITIVE
PH, URINE: 7 (ref 4.5–8)
PROTEIN (URINE DIPSTICK): NEGATIVE MG/DL
SPECIFIC GRAVITY: 1.01 (ref 1–1.03)
UROBILINOGEN,SEMI-QN: 0.2 MG/DL (ref 0–1.9)

## 2022-02-25 PROCEDURE — 3008F BODY MASS INDEX DOCD: CPT | Performed by: NURSE PRACTITIONER

## 2022-02-25 PROCEDURE — 99214 OFFICE O/P EST MOD 30 MIN: CPT | Performed by: NURSE PRACTITIONER

## 2022-02-25 PROCEDURE — 3074F SYST BP LT 130 MM HG: CPT | Performed by: NURSE PRACTITIONER

## 2022-02-25 PROCEDURE — 81003 URINALYSIS AUTO W/O SCOPE: CPT | Performed by: NURSE PRACTITIONER

## 2022-02-25 PROCEDURE — 3078F DIAST BP <80 MM HG: CPT | Performed by: NURSE PRACTITIONER

## 2022-02-25 RX ORDER — NITROFURANTOIN 25; 75 MG/1; MG/1
100 CAPSULE ORAL 2 TIMES DAILY
Qty: 20 CAPSULE | Refills: 0 | Status: SHIPPED | OUTPATIENT
Start: 2022-02-25 | End: 2022-03-07

## 2022-02-25 NOTE — PATIENT INSTRUCTIONS
Urinary tract infection cause and prevention discussed. Drink more water, don't hold urine, urinate after intercourse, don't take bubble baths or use scented soaps, don't use powders, keep bowels regular. Follow up if symptoms persist or if you experience flank pain, vomiting, or fever. Start prenatal vitamins now, you may continue exercise,  but monitor heart rate. Keep your heart rate below 140. Be cautious not to over heat,  you  want to keep a normal core body temperature. If you develop a fever you may only use Tylenol over the counter for now, you may ask for a list of  pregnant safe medications from OB. You could also get your temperature down by taking a tepid bath/shower. Avoid hot tubs or saunas, no nicki diving or scuba diving. Mild cramping is normal with round ligament stretching, but you should seek care if  severe cramping and/or bleeding. Call OB/GYN office to schedule an appointment for further care.

## 2022-03-02 ENCOUNTER — OFFICE VISIT (OUTPATIENT)
Dept: FAMILY MEDICINE CLINIC | Facility: CLINIC | Age: 32
End: 2022-03-02
Payer: COMMERCIAL

## 2022-03-02 VITALS
TEMPERATURE: 99 F | BODY MASS INDEX: 31.92 KG/M2 | HEIGHT: 64 IN | RESPIRATION RATE: 16 BRPM | HEART RATE: 76 BPM | SYSTOLIC BLOOD PRESSURE: 118 MMHG | DIASTOLIC BLOOD PRESSURE: 76 MMHG | OXYGEN SATURATION: 97 % | WEIGHT: 187 LBS

## 2022-03-02 DIAGNOSIS — L08.9 PUSTULE: ICD-10-CM

## 2022-03-02 DIAGNOSIS — E53.8 B12 DEFICIENCY: ICD-10-CM

## 2022-03-02 DIAGNOSIS — N39.0 URINARY TRACT INFECTION WITHOUT HEMATURIA, SITE UNSPECIFIED: ICD-10-CM

## 2022-03-02 DIAGNOSIS — G43.709 CHRONIC MIGRAINE WITHOUT AURA WITHOUT STATUS MIGRAINOSUS, NOT INTRACTABLE: ICD-10-CM

## 2022-03-02 DIAGNOSIS — J45.20 MILD INTERMITTENT ASTHMA WITHOUT COMPLICATION: ICD-10-CM

## 2022-03-02 DIAGNOSIS — Z00.00 ANNUAL PHYSICAL EXAM: Primary | ICD-10-CM

## 2022-03-02 PROCEDURE — 99395 PREV VISIT EST AGE 18-39: CPT | Performed by: FAMILY MEDICINE

## 2022-03-02 PROCEDURE — 3008F BODY MASS INDEX DOCD: CPT | Performed by: FAMILY MEDICINE

## 2022-03-02 PROCEDURE — 3074F SYST BP LT 130 MM HG: CPT | Performed by: FAMILY MEDICINE

## 2022-03-02 PROCEDURE — 3078F DIAST BP <80 MM HG: CPT | Performed by: FAMILY MEDICINE

## 2022-03-02 RX ORDER — CIPROFLOXACIN AND DEXAMETHASONE 3; 1 MG/ML; MG/ML
4 SUSPENSION/ DROPS AURICULAR (OTIC) 2 TIMES DAILY
Qty: 1 EACH | Refills: 0 | Status: SHIPPED | OUTPATIENT
Start: 2022-03-02

## 2022-03-02 NOTE — PATIENT INSTRUCTIONS
rec antibiotic drops left ear     finish oral atb for uti    Recheck 1-2 weeks    rec gyne f.u    rec fasting labs    Encourage healthy diet and exercise for weight loss    Avoid alcohol and THC

## 2022-03-09 ENCOUNTER — LABORATORY ENCOUNTER (OUTPATIENT)
Dept: LAB | Age: 32
End: 2022-03-09
Attending: FAMILY MEDICINE
Payer: COMMERCIAL

## 2022-03-09 ENCOUNTER — TELEPHONE (OUTPATIENT)
Dept: FAMILY MEDICINE CLINIC | Facility: CLINIC | Age: 32
End: 2022-03-09

## 2022-03-09 ENCOUNTER — OFFICE VISIT (OUTPATIENT)
Dept: FAMILY MEDICINE CLINIC | Facility: CLINIC | Age: 32
End: 2022-03-09
Payer: COMMERCIAL

## 2022-03-09 VITALS
SYSTOLIC BLOOD PRESSURE: 102 MMHG | DIASTOLIC BLOOD PRESSURE: 82 MMHG | HEIGHT: 65 IN | HEART RATE: 90 BPM | TEMPERATURE: 100 F | WEIGHT: 184.19 LBS | BODY MASS INDEX: 30.69 KG/M2 | RESPIRATION RATE: 20 BRPM

## 2022-03-09 DIAGNOSIS — Z00.00 ANNUAL PHYSICAL EXAM: ICD-10-CM

## 2022-03-09 DIAGNOSIS — E53.8 B12 DEFICIENCY: ICD-10-CM

## 2022-03-09 DIAGNOSIS — S00.422D: Primary | ICD-10-CM

## 2022-03-09 LAB
ALBUMIN SERPL-MCNC: 4.3 G/DL (ref 3.4–5)
ALBUMIN/GLOB SERPL: 1.4 {RATIO} (ref 1–2)
ALP LIVER SERPL-CCNC: 64 U/L
ALT SERPL-CCNC: 21 U/L
ANION GAP SERPL CALC-SCNC: 4 MMOL/L (ref 0–18)
AST SERPL-CCNC: 16 U/L (ref 15–37)
BASOPHILS # BLD AUTO: 0.05 X10(3) UL (ref 0–0.2)
BASOPHILS NFR BLD AUTO: 0.9 %
BILIRUB SERPL-MCNC: 3.4 MG/DL (ref 0.1–2)
BUN BLD-MCNC: 7 MG/DL (ref 7–18)
CALCIUM BLD-MCNC: 9.8 MG/DL (ref 8.5–10.1)
CHLORIDE SERPL-SCNC: 105 MMOL/L (ref 98–112)
CHOLEST SERPL-MCNC: 187 MG/DL (ref ?–200)
CO2 SERPL-SCNC: 28 MMOL/L (ref 21–32)
CREAT BLD-MCNC: 0.88 MG/DL
EOSINOPHIL # BLD AUTO: 0.1 X10(3) UL (ref 0–0.7)
EOSINOPHIL NFR BLD AUTO: 1.9 %
ERYTHROCYTE [DISTWIDTH] IN BLOOD BY AUTOMATED COUNT: 12.4 %
FASTING PATIENT LIPID ANSWER: YES
FASTING STATUS PATIENT QL REPORTED: YES
GLUCOSE BLD-MCNC: 99 MG/DL (ref 70–99)
HCT VFR BLD AUTO: 46.5 %
HDLC SERPL-MCNC: 74 MG/DL (ref 40–59)
HGB BLD-MCNC: 15.3 G/DL
IMM GRANULOCYTES # BLD AUTO: 0.01 X10(3) UL (ref 0–1)
IMM GRANULOCYTES NFR BLD: 0.2 %
LDLC SERPL CALC-MCNC: 99 MG/DL (ref ?–100)
LYMPHOCYTES # BLD AUTO: 1.83 X10(3) UL (ref 1–4)
LYMPHOCYTES NFR BLD AUTO: 34.6 %
MCH RBC QN AUTO: 30.8 PG (ref 26–34)
MCHC RBC AUTO-ENTMCNC: 32.9 G/DL (ref 31–37)
MCV RBC AUTO: 93.6 FL
MONOCYTES # BLD AUTO: 0.51 X10(3) UL (ref 0.1–1)
MONOCYTES NFR BLD AUTO: 9.6 %
NEUTROPHILS # BLD AUTO: 2.79 X10 (3) UL (ref 1.5–7.7)
NEUTROPHILS # BLD AUTO: 2.79 X10(3) UL (ref 1.5–7.7)
NEUTROPHILS NFR BLD AUTO: 52.8 %
NONHDLC SERPL-MCNC: 113 MG/DL (ref ?–130)
OSMOLALITY SERPL CALC.SUM OF ELEC: 282 MOSM/KG (ref 275–295)
PLATELET # BLD AUTO: 241 10(3)UL (ref 150–450)
POTASSIUM SERPL-SCNC: 4.2 MMOL/L (ref 3.5–5.1)
PROT SERPL-MCNC: 7.3 G/DL (ref 6.4–8.2)
T4 FREE SERPL-MCNC: 1 NG/DL (ref 0.8–1.7)
TRIGL SERPL-MCNC: 78 MG/DL (ref 30–149)
TSI SER-ACNC: 1.71 MIU/ML (ref 0.36–3.74)
VIT B12 SERPL-MCNC: 668 PG/ML (ref 193–986)
VLDLC SERPL CALC-MCNC: 13 MG/DL (ref 0–30)
WBC # BLD AUTO: 5.3 X10(3) UL (ref 4–11)

## 2022-03-09 PROCEDURE — 84439 ASSAY OF FREE THYROXINE: CPT | Performed by: FAMILY MEDICINE

## 2022-03-09 PROCEDURE — 80061 LIPID PANEL: CPT | Performed by: FAMILY MEDICINE

## 2022-03-09 PROCEDURE — 80050 GENERAL HEALTH PANEL: CPT | Performed by: FAMILY MEDICINE

## 2022-03-09 PROCEDURE — 3074F SYST BP LT 130 MM HG: CPT | Performed by: FAMILY MEDICINE

## 2022-03-09 PROCEDURE — 82607 VITAMIN B-12: CPT | Performed by: FAMILY MEDICINE

## 2022-03-09 PROCEDURE — 99212 OFFICE O/P EST SF 10 MIN: CPT | Performed by: FAMILY MEDICINE

## 2022-03-09 PROCEDURE — 3008F BODY MASS INDEX DOCD: CPT | Performed by: FAMILY MEDICINE

## 2022-03-09 PROCEDURE — 3079F DIAST BP 80-89 MM HG: CPT | Performed by: FAMILY MEDICINE

## 2022-03-09 NOTE — TELEPHONE ENCOUNTER
Pt informed. Pt states she will monitor, states she will call back if pt decides sooner appt would be needed.
Pt seen this AM-  Pt question routed for review.
Reviewed- use drops another 5 days, then as needed  Can consider asking to be on waitlist  I can referr to ENT out of town if desired.--[facility], patient choice
can not get in to the specialist for 5 weeks, should she continue to use the drops until she sees them
Consent: The patient's consent was obtained including but not limited to risks of crusting, scabbing, blistering, scarring, darker or lighter pigmentary change, recurrence, incomplete removal and infection.
Detail Level: Detailed
Render Post-Care Instructions In Note?: no
Post-Care Instructions: I reviewed with the patient in detail post-care instructions. Patient is to wear sunprotection, and avoid picking at any of the treated lesions. Pt may apply Vaseline to crusted or scabbing areas.
Duration Of Freeze Thaw-Cycle (Seconds): 0

## 2022-03-10 NOTE — TELEPHONE ENCOUNTER
----- Message from Elmira Townsend MD sent at 3/9/2022  7:06 PM CST -----  Laboratory results reviewed. Patient's B12 level normal.  Patient's chemistry profile showing isolated elevated bilirubin. Other liver enzymes are otherwise normal.  Lipid profile is normal.  Thyroid function normal.  CBC normal.    Isolated elevation of bilirubin is generally a benign process however as this is new I would recommend that we repeat her liver enzymes in 1 month. Results forwarded to patient via [employer] system. Patient encouraged to call for any questions or concerns.

## 2022-03-10 NOTE — TELEPHONE ENCOUNTER
Pt informed. Pt verbalized understanding. Pt agreed to call back tomorrow to schedule lab appt for one month recheck.

## 2022-03-24 ENCOUNTER — OFFICE VISIT (OUTPATIENT)
Dept: FAMILY MEDICINE CLINIC | Facility: CLINIC | Age: 32
End: 2022-03-24
Payer: COMMERCIAL

## 2022-03-24 VITALS
SYSTOLIC BLOOD PRESSURE: 90 MMHG | OXYGEN SATURATION: 98 % | DIASTOLIC BLOOD PRESSURE: 50 MMHG | TEMPERATURE: 98 F | HEART RATE: 61 BPM

## 2022-03-24 DIAGNOSIS — J45.901 ACUTE EXACERBATION OF ASTHMA WITH ALLERGIC RHINITIS: Primary | ICD-10-CM

## 2022-03-24 PROCEDURE — 99213 OFFICE O/P EST LOW 20 MIN: CPT | Performed by: NURSE PRACTITIONER

## 2022-03-24 PROCEDURE — 3078F DIAST BP <80 MM HG: CPT | Performed by: NURSE PRACTITIONER

## 2022-03-24 PROCEDURE — 3074F SYST BP LT 130 MM HG: CPT | Performed by: NURSE PRACTITIONER

## 2022-03-24 RX ORDER — PREDNISONE 20 MG/1
20 TABLET ORAL 2 TIMES DAILY
Qty: 10 TABLET | Refills: 0 | Status: SHIPPED | OUTPATIENT
Start: 2022-03-24 | End: 2022-03-29

## 2022-03-24 NOTE — PATIENT INSTRUCTIONS
Take prednisone with food and not at bedtime. Treat symptoms as needed. Fluids and rest.     Follow up if not improving or worsens.

## 2022-03-25 ENCOUNTER — TELEPHONE (OUTPATIENT)
Dept: FAMILY MEDICINE CLINIC | Facility: CLINIC | Age: 32
End: 2022-03-25

## 2022-03-25 RX ORDER — ALBUTEROL SULFATE 2.5 MG/3ML
2.5 SOLUTION RESPIRATORY (INHALATION) EVERY 4 HOURS PRN
Qty: 30 EACH | Refills: 1 | Status: SHIPPED | OUTPATIENT
Start: 2022-03-25

## 2022-03-25 RX ORDER — ALBUTEROL SULFATE 90 UG/1
AEROSOL, METERED RESPIRATORY (INHALATION)
Qty: 1 EACH | Refills: 1 | Status: SHIPPED | OUTPATIENT
Start: 2022-03-25

## 2022-03-25 NOTE — TELEPHONE ENCOUNTER
Pt calling and states she needs her nebulizer solution and not the inhaler that was sent. Please advise - call sent to [facility] to clarify rx.

## 2022-03-25 NOTE — TELEPHONE ENCOUNTER
Pt was seen in sick clinic yesterday. Pt c/o cough, congestion, states she had a slight fever. Pt states her BP was low: 90/50. Pt states covid home test was negative. Pt states RX for Proair was refilled. Pt states she canceled that prescription- pt is requesting her neb solution to [facility].     Neb solution not found on active med list-  Last issued back March of 2019    Routed to PCP

## 2022-03-25 NOTE — TELEPHONE ENCOUNTER
Looking for a refill on her Albuteral Sulfate Inhalation [facility]:  [facility].   Future Appointments   Date Time Provider Amberly Granger   4/7/2022 10:00 AM REF [facility]

## 2022-03-25 NOTE — TELEPHONE ENCOUNTER
From theChart reviewed. Patient seen yesterday for exacerbation of asthma and started on prednisone. Okay for refill albuterol for nebulizer her machine.

## 2022-03-25 NOTE — TELEPHONE ENCOUNTER
Future appt: Your appointments     Date & Time Appointment Department [facility])    Apr 07, 2022 10:00 AM CDT Laboratory Visit with RE[facility] ([facility])            [facility]  [facility] [id number]  [phone]        Last Appointment with provider:   3/9/2022 for follow up, ear lesion. 3/2/22 for annual physical.  Last appointment at [facility]:  3/24/2022 with CS for exacerbation of asthma and allergic rhinitis  Cholesterol, Total (mg/dL)   Date Value   03/09/2022 187     HDL Cholesterol (mg/dL)   Date Value   03/09/2022 74 (H)     LDL Cholesterol (mg/dL)   Date Value   03/09/2022 99     Triglycerides (mg/dL)   Date Value   03/09/2022 78     No results found for: EAG, A1C  Lab Results   Component Value Date    T4F 1.0 03/09/2022    TSH 1.710 03/09/2022       No follow-ups on file.

## 2022-04-07 ENCOUNTER — LABORATORY ENCOUNTER (OUTPATIENT)
Dept: LAB | Age: 32
End: 2022-04-07
Attending: FAMILY MEDICINE
Payer: COMMERCIAL

## 2022-04-07 DIAGNOSIS — J02.9 SORE THROAT: ICD-10-CM

## 2022-04-07 DIAGNOSIS — R17 ELEVATED BILIRUBIN: ICD-10-CM

## 2022-04-07 LAB
ALBUMIN SERPL-MCNC: 3.9 G/DL (ref 3.4–5)
ALBUMIN/GLOB SERPL: 1.3 {RATIO} (ref 1–2)
ALP LIVER SERPL-CCNC: 60 U/L
ALT SERPL-CCNC: 23 U/L
ANION GAP SERPL CALC-SCNC: 2 MMOL/L (ref 0–18)
AST SERPL-CCNC: 13 U/L (ref 15–37)
BILIRUB SERPL-MCNC: 1.4 MG/DL (ref 0.1–2)
BUN BLD-MCNC: 13 MG/DL (ref 7–18)
CALCIUM BLD-MCNC: 9 MG/DL (ref 8.5–10.1)
CHLORIDE SERPL-SCNC: 106 MMOL/L (ref 98–112)
CO2 SERPL-SCNC: 29 MMOL/L (ref 21–32)
CREAT BLD-MCNC: 0.84 MG/DL
FASTING STATUS PATIENT QL REPORTED: YES
GGT SERPL-CCNC: 18 U/L
GLOBULIN PLAS-MCNC: 2.9 G/DL (ref 2.8–4.4)
GLUCOSE BLD-MCNC: 93 MG/DL (ref 70–99)
OSMOLALITY SERPL CALC.SUM OF ELEC: 284 MOSM/KG (ref 275–295)
POTASSIUM SERPL-SCNC: 4.5 MMOL/L (ref 3.5–5.1)
PROT SERPL-MCNC: 6.8 G/DL (ref 6.4–8.2)
SODIUM SERPL-SCNC: 137 MMOL/L (ref 136–145)

## 2022-04-07 PROCEDURE — 80053 COMPREHEN METABOLIC PANEL: CPT

## 2022-04-07 PROCEDURE — 82977 ASSAY OF GGT: CPT

## 2022-04-07 PROCEDURE — 36415 COLL VENOUS BLD VENIPUNCTURE: CPT

## 2022-05-05 ENCOUNTER — TELEPHONE (OUTPATIENT)
Dept: FAMILY MEDICINE CLINIC | Facility: CLINIC | Age: 32
End: 2022-05-05

## 2022-05-05 RX ORDER — AMOXICILLIN AND CLAVULANATE POTASSIUM 875; 125 MG/1; MG/1
1 TABLET, FILM COATED ORAL 2 TIMES DAILY
Qty: 6 TABLET | Refills: 0 | Status: SHIPPED | OUTPATIENT
Start: 2022-05-05 | End: 2022-05-08

## 2022-05-05 NOTE — TELEPHONE ENCOUNTER
Pt feels has UTI- taking AZO. Called 445- as symptoms started. Noted burning. freq voiding, incontince. Mild chills. Pt with 2 uti in last 3 months  lmp 2 week ago    D/w pt -- rec 3 day antibiotic and test urine in 1 week.  If not clearing, urology consult advised

## 2022-05-06 ENCOUNTER — TELEPHONE (OUTPATIENT)
Dept: FAMILY MEDICINE CLINIC | Facility: CLINIC | Age: 32
End: 2022-05-06

## 2022-05-06 NOTE — TELEPHONE ENCOUNTER
Patient having trouble with recurring UTIs / see previous phone note.  Made f/u appt but unsure if she needs appt or just referral.    Future Appointments   Date Time Provider Amberly Granger   5/13/2022  4:00 PM Elias Gray MD [facility]

## 2022-05-11 ENCOUNTER — LABORATORY ENCOUNTER (OUTPATIENT)
Dept: LAB | Age: 32
End: 2022-05-11
Attending: FAMILY MEDICINE
Payer: COMMERCIAL

## 2022-05-11 DIAGNOSIS — N30.00 ACUTE CYSTITIS WITHOUT HEMATURIA: ICD-10-CM

## 2022-05-11 LAB
BILIRUB UR QL STRIP.AUTO: NEGATIVE
CLARITY UR REFRACT.AUTO: CLEAR
GLUCOSE UR STRIP.AUTO-MCNC: NEGATIVE MG/DL
KETONES UR STRIP.AUTO-MCNC: NEGATIVE MG/DL
LEUKOCYTE ESTERASE UR QL STRIP.AUTO: NEGATIVE
NITRITE UR QL STRIP.AUTO: NEGATIVE
PH UR STRIP.AUTO: 7 [PH] (ref 5–8)
PROT UR STRIP.AUTO-MCNC: NEGATIVE MG/DL
RBC UR QL AUTO: NEGATIVE
SP GR UR STRIP.AUTO: 1 (ref 1–1.03)
UROBILINOGEN UR STRIP.AUTO-MCNC: <2 MG/DL

## 2022-05-11 PROCEDURE — 81003 URINALYSIS AUTO W/O SCOPE: CPT | Performed by: FAMILY MEDICINE

## 2022-05-12 ENCOUNTER — TELEPHONE (OUTPATIENT)
Dept: FAMILY MEDICINE CLINIC | Facility: CLINIC | Age: 32
End: 2022-05-12

## 2022-05-12 NOTE — TELEPHONE ENCOUNTER
Patient has had 3 UTI's since beginning of this year, a pattern which has not happened for the 8 years prior. Patient has seen a specialist in the past. Advised patient to keep the appointment to discuss whether referral would be appropriate course of action.

## 2022-05-12 NOTE — TELEPHONE ENCOUNTER
----- Message from Santo Ponce MD sent at 5/12/2022  7:53 AM CDT -----  Urine negative and reassuring. Results forwarded to patient via [employer] system. Patient encouraged to call for any questions or concerns.

## 2022-05-13 ENCOUNTER — OFFICE VISIT (OUTPATIENT)
Dept: FAMILY MEDICINE CLINIC | Facility: CLINIC | Age: 32
End: 2022-05-13
Payer: COMMERCIAL

## 2022-05-13 VITALS
TEMPERATURE: 99 F | SYSTOLIC BLOOD PRESSURE: 110 MMHG | BODY MASS INDEX: 32.12 KG/M2 | HEART RATE: 86 BPM | OXYGEN SATURATION: 98 % | DIASTOLIC BLOOD PRESSURE: 62 MMHG | HEIGHT: 65 IN | WEIGHT: 192.81 LBS | RESPIRATION RATE: 16 BRPM

## 2022-05-13 DIAGNOSIS — R30.0 DYSURIA: Primary | ICD-10-CM

## 2022-05-13 LAB
APPEARANCE: CLEAR
BILIRUB UR QL STRIP.AUTO: NEGATIVE
BILIRUBIN: NEGATIVE
CLARITY UR REFRACT.AUTO: CLEAR
COLOR UR AUTO: COLORLESS
GLUCOSE (URINE DIPSTICK): NEGATIVE MG/DL
GLUCOSE UR STRIP.AUTO-MCNC: NEGATIVE MG/DL
KETONES (URINE DIPSTICK): NEGATIVE MG/DL
KETONES UR STRIP.AUTO-MCNC: NEGATIVE MG/DL
LEUKOCYTE ESTERASE UR QL STRIP.AUTO: NEGATIVE
LEUKOCYTES: NEGATIVE
MULTISTIX LOT#: ABNORMAL NUMERIC
NITRITE UR QL STRIP.AUTO: NEGATIVE
NITRITE, URINE: NEGATIVE
PH UR STRIP.AUTO: 6 [PH] (ref 5–8)
PH, URINE: 5 (ref 4.5–8)
PROT UR STRIP.AUTO-MCNC: NEGATIVE MG/DL
PROTEIN (URINE DIPSTICK): NEGATIVE MG/DL
SP GR UR STRIP.AUTO: 1 (ref 1–1.03)
SPECIFIC GRAVITY: <=1.005 (ref 1–1.03)
UROBILINOGEN UR STRIP.AUTO-MCNC: <2 MG/DL
UROBILINOGEN,SEMI-QN: 0.2 MG/DL (ref 0–1.9)

## 2022-05-13 PROCEDURE — 87086 URINE CULTURE/COLONY COUNT: CPT | Performed by: FAMILY MEDICINE

## 2022-05-13 PROCEDURE — 81001 URINALYSIS AUTO W/SCOPE: CPT | Performed by: FAMILY MEDICINE

## 2023-02-07 ENCOUNTER — OFFICE VISIT (OUTPATIENT)
Dept: FAMILY MEDICINE CLINIC | Facility: CLINIC | Age: 33
End: 2023-02-07
Payer: COMMERCIAL

## 2023-02-07 ENCOUNTER — TELEPHONE (OUTPATIENT)
Dept: FAMILY MEDICINE CLINIC | Facility: CLINIC | Age: 33
End: 2023-02-07

## 2023-02-07 VITALS
TEMPERATURE: 97 F | DIASTOLIC BLOOD PRESSURE: 84 MMHG | OXYGEN SATURATION: 99 % | WEIGHT: 192 LBS | HEIGHT: 65 IN | SYSTOLIC BLOOD PRESSURE: 112 MMHG | BODY MASS INDEX: 31.99 KG/M2 | HEART RATE: 79 BPM | RESPIRATION RATE: 18 BRPM

## 2023-02-07 DIAGNOSIS — L73.9 FOLLICULITIS: Primary | ICD-10-CM

## 2023-02-07 PROCEDURE — 3079F DIAST BP 80-89 MM HG: CPT | Performed by: NURSE PRACTITIONER

## 2023-02-07 PROCEDURE — 3074F SYST BP LT 130 MM HG: CPT | Performed by: NURSE PRACTITIONER

## 2023-02-07 PROCEDURE — 3008F BODY MASS INDEX DOCD: CPT | Performed by: NURSE PRACTITIONER

## 2023-02-07 PROCEDURE — 99214 OFFICE O/P EST MOD 30 MIN: CPT | Performed by: NURSE PRACTITIONER

## 2023-02-07 RX ORDER — CEPHALEXIN 500 MG/1
500 CAPSULE ORAL 3 TIMES DAILY
Qty: 30 CAPSULE | Refills: 0 | Status: SHIPPED | OUTPATIENT
Start: 2023-02-07 | End: 2023-02-17

## 2023-02-07 RX ORDER — ASPIRIN 81 MG/1
TABLET, COATED ORAL
COMMUNITY
Start: 2023-01-11

## 2023-02-07 NOTE — TELEPHONE ENCOUNTER
Has finished IBS medicaiton and now has a rash, not acne or a side effect of medication, not eating anything new or change in laundry soap, was told to call pcp, please advise

## 2023-02-07 NOTE — TELEPHONE ENCOUNTER
Pt c/o of nasal congestion and rash- started last Thursday, worse over the weekend. Pt states rash is bumpy and feels hot. Rash is all over her back, chest, and spreading to abdominal area. Pt states she had a migraine on Sunday. Pt did check and reported having a negative home covid test at that time. Pt took some Benadryl otc. Pt scheduled for appt with EL this afternoon with sick clinic.     Future Appointments   Date Time Provider Amberly Granger   2/7/2023  3:20 PM FIORDALIZA Kwon [facility]

## 2023-04-13 NOTE — PROGRESS NOTES
CHIEF COMPLAINT:   Patient presents with:  Sore Throat: Sore throat x2 days   Fever: Highest temp was 101.0      HPI:   Agnes Osorio is a 30year old female who presents to clinic today with complaints of sore throat, fever.     Symptoms started 2 days skin lesions or rashes  HEENT: See HPI  LUNGS: No cough, shortness of breath, or wheezing. CARDIOVASCULAR: No chest pain, palpitations  GI: No nausea, vomiting, constipation, diarrhea. NEURO: Denies dizziness, numbness, nor tingling in face.  See HPI understanding and is in agreement with treatment plan. Follow up with primary care provider in 2-3 days if symptoms do not improve or if symptoms worsen. Risk and benefits of medication discussed.        There are no Patient Instructions on file for this (0) independent no

## (undated) DIAGNOSIS — N30.00 ACUTE CYSTITIS WITHOUT HEMATURIA: Primary | ICD-10-CM

## (undated) DIAGNOSIS — R17 ELEVATED BILIRUBIN: Primary | ICD-10-CM

## (undated) NOTE — MR AVS SNAPSHOT
[address]  [place] [address] [id number]  [phone]               Thank you for choosing us for your health care visit with Laura Bernard MD.  We are glad to serve you and happy to provide you with this summary cefdinir 300 MG Caps   Take 1 capsule (300 mg total) by mouth 2 (two) times daily. Commonly known as:  OMNICEF           FIORINAL -40 MG Caps   Generic drug:  butalbital-aspirin-caffeine   Take 2 capsules by mouth.  As directed PRN           IMITRE

## (undated) NOTE — LETTER
05/15/21    To whom it may concern,     Katie Fleming should be excused from attending work until May 21, 2021. She tested positive for COVID-19.        Thank you,    FIORDALIZA Perkins, [employer] - [facility]

## (undated) NOTE — LETTER
Date: 3/11/2019    Patient Name: Gabby Godfrey          To Whom it may concern: This letter has been written at the patient's request. The above patient was seen at the [facility] for treatment of a medical condition.     This patient lukeu

## (undated) NOTE — MR AVS SNAPSHOT
[address]  [place] [address] [id number] [phone]               Thank you for choosing us for your health care visit with SIMONE Vilchis.   We are glad to serve you and happy to provide you with this summary of yo Take 3 mL (2.5 mg total) by nebulization every 4 (four) hours as needed for Wheezing. PRN use for asthma   Commonly known as:  VENTOLIN           amoxicillin 500 MG Caps   Take 1 capsule (500 mg total) by mouth 3 (three) times daily.    Commonly known as: [email or web address]

## (undated) NOTE — Clinical Note
Date: 5/26/2017    Patient Name: Dennie Mackie          To Whom it may concern: This letter has been written at the patient's request. The above patient was seen at the [facility] for treatment of a medical condition.     This patient shou